# Patient Record
Sex: FEMALE | Race: BLACK OR AFRICAN AMERICAN | Employment: FULL TIME | URBAN - METROPOLITAN AREA
[De-identification: names, ages, dates, MRNs, and addresses within clinical notes are randomized per-mention and may not be internally consistent; named-entity substitution may affect disease eponyms.]

---

## 2019-01-14 ENCOUNTER — HOSPITAL ENCOUNTER (OUTPATIENT)
Dept: MAMMOGRAPHY | Age: 39
Discharge: HOME | End: 2019-01-14
Payer: COMMERCIAL

## 2019-01-14 ENCOUNTER — HOSPITAL ENCOUNTER (OUTPATIENT)
Dept: ULTRASOUND IMAGING | Age: 39
Discharge: HOME | End: 2019-01-14
Payer: COMMERCIAL

## 2019-01-14 DIAGNOSIS — R92.8 ABNORMAL MAMMOGRAM: ICD-10-CM

## 2019-01-14 DIAGNOSIS — Z12.31 VISIT FOR SCREENING MAMMOGRAM: ICD-10-CM

## 2019-01-14 PROCEDURE — 77067 SCR MAMMO BI INCL CAD: CPT

## 2019-01-14 PROCEDURE — 76642 ULTRASOUND BREAST LIMITED: CPT

## 2019-01-14 PROCEDURE — G0279 TOMOSYNTHESIS, MAMMO: HCPCS

## 2019-01-17 ENCOUNTER — HOSPITAL ENCOUNTER (OUTPATIENT)
Dept: ULTRASOUND IMAGING | Age: 39
Discharge: HOME | End: 2019-01-17
Payer: COMMERCIAL

## 2019-01-17 ENCOUNTER — HOSPITAL ENCOUNTER (OUTPATIENT)
Dept: MAMMOGRAPHY | Age: 39
Discharge: HOME | End: 2019-01-17
Payer: COMMERCIAL

## 2019-01-17 DIAGNOSIS — R92.8 ABNORMAL ULTRASOUND OF BREAST: ICD-10-CM

## 2019-01-17 DIAGNOSIS — R92.8 ABNORMAL MAMMOGRAM: ICD-10-CM

## 2019-01-17 PROCEDURE — 77065 DX MAMMO INCL CAD UNI: CPT

## 2019-01-17 PROCEDURE — 88341 IMHCHEM/IMCYTCHM EA ADD ANTB: CPT

## 2019-01-17 PROCEDURE — 88305 TISSUE EXAM BY PATHOLOGIST: CPT

## 2019-01-17 PROCEDURE — 38505 NEEDLE BIOPSY LYMPH NODES: CPT

## 2019-01-17 PROCEDURE — 88360 TUMOR IMMUNOHISTOCHEM/MANUAL: CPT

## 2019-01-17 PROCEDURE — A4648 IMPLANTABLE TISSUE MARKER: HCPCS

## 2019-01-17 PROCEDURE — 88342 IMHCHEM/IMCYTCHM 1ST ANTB: CPT

## 2019-01-18 ENCOUNTER — OFFICE VISIT (OUTPATIENT)
Dept: SURGERY | Age: 39
End: 2019-01-18
Payer: COMMERCIAL

## 2019-01-18 ENCOUNTER — TELEPHONE (OUTPATIENT)
Dept: SURGERY | Age: 39
End: 2019-01-18

## 2019-01-18 VITALS
WEIGHT: 221 LBS | HEART RATE: 83 BPM | BODY MASS INDEX: 34.69 KG/M2 | HEIGHT: 67 IN | SYSTOLIC BLOOD PRESSURE: 125 MMHG | DIASTOLIC BLOOD PRESSURE: 83 MMHG

## 2019-01-18 DIAGNOSIS — C50.412 MALIGNANT NEOPLASM OF UPPER-OUTER QUADRANT OF LEFT BREAST IN FEMALE, ESTROGEN RECEPTOR POSITIVE (HCC): Primary | ICD-10-CM

## 2019-01-18 DIAGNOSIS — Z17.0 MALIGNANT NEOPLASM OF UPPER-OUTER QUADRANT OF LEFT BREAST IN FEMALE, ESTROGEN RECEPTOR POSITIVE (HCC): Primary | ICD-10-CM

## 2019-01-18 PROBLEM — C50.411 MALIGNANT NEOPLASM OF UPPER-OUTER QUADRANT OF RIGHT BREAST IN FEMALE, ESTROGEN RECEPTOR POSITIVE (HCC): Status: ACTIVE | Noted: 2019-01-18

## 2019-01-18 PROCEDURE — G8417 CALC BMI ABV UP PARAM F/U: HCPCS | Performed by: SURGERY

## 2019-01-18 PROCEDURE — G8427 DOCREV CUR MEDS BY ELIG CLIN: HCPCS | Performed by: SURGERY

## 2019-01-18 PROCEDURE — 99244 OFF/OP CNSLTJ NEW/EST MOD 40: CPT | Performed by: SURGERY

## 2019-01-18 PROCEDURE — G8484 FLU IMMUNIZE NO ADMIN: HCPCS | Performed by: SURGERY

## 2019-01-30 ENCOUNTER — HOSPITAL ENCOUNTER (OUTPATIENT)
Dept: MRI IMAGING | Age: 39
Discharge: HOME | End: 2019-01-30
Payer: COMMERCIAL

## 2019-01-30 DIAGNOSIS — Z17.0 MALIGNANT NEOPLASM OF UPPER-OUTER QUADRANT OF RIGHT BREAST IN FEMALE, ESTROGEN RECEPTOR POSITIVE (HCC): ICD-10-CM

## 2019-01-30 DIAGNOSIS — C50.411 MALIGNANT NEOPLASM OF UPPER-OUTER QUADRANT OF RIGHT BREAST IN FEMALE, ESTROGEN RECEPTOR POSITIVE (HCC): ICD-10-CM

## 2019-01-30 PROCEDURE — 6360000004 HC RX CONTRAST MEDICATION: Performed by: SURGERY

## 2019-01-30 PROCEDURE — A9579 GAD-BASE MR CONTRAST NOS,1ML: HCPCS | Performed by: SURGERY

## 2019-01-30 PROCEDURE — 77049 MRI BREAST C-+ W/CAD BI: CPT

## 2019-01-30 RX ADMIN — GADOTERIDOL 20 ML: 279.3 INJECTION, SOLUTION INTRAVENOUS at 15:59

## 2019-01-31 ENCOUNTER — HOSPITAL ENCOUNTER (OUTPATIENT)
Dept: NON INVASIVE DIAGNOSTICS | Age: 39
Discharge: HOME | End: 2019-01-31
Payer: COMMERCIAL

## 2019-01-31 ENCOUNTER — TELEPHONE (OUTPATIENT)
Dept: SURGERY | Age: 39
End: 2019-01-31

## 2019-01-31 LAB
LV EF: 53 %
LVEF MODALITY: NORMAL

## 2019-01-31 PROCEDURE — 93306 TTE W/DOPPLER COMPLETE: CPT

## 2019-02-04 ENCOUNTER — TELEPHONE (OUTPATIENT)
Dept: SURGERY | Age: 39
End: 2019-02-04

## 2019-02-05 ENCOUNTER — TELEPHONE (OUTPATIENT)
Dept: BREAST CENTER | Age: 39
End: 2019-02-05

## 2019-02-06 ENCOUNTER — ANESTHESIA EVENT (OUTPATIENT)
Dept: OPERATING ROOM | Age: 39
End: 2019-02-06
Payer: COMMERCIAL

## 2019-02-07 ENCOUNTER — HOSPITAL ENCOUNTER (OUTPATIENT)
Age: 39
Setting detail: OUTPATIENT SURGERY
Discharge: HOME | End: 2019-02-07
Attending: SURGERY | Admitting: SURGERY
Payer: COMMERCIAL

## 2019-02-07 ENCOUNTER — ANESTHESIA (OUTPATIENT)
Dept: OPERATING ROOM | Age: 39
End: 2019-02-07
Payer: COMMERCIAL

## 2019-02-07 ENCOUNTER — APPOINTMENT (OUTPATIENT)
Dept: GENERAL RADIOLOGY | Age: 39
End: 2019-02-07
Attending: SURGERY
Payer: COMMERCIAL

## 2019-02-07 VITALS
SYSTOLIC BLOOD PRESSURE: 120 MMHG | RESPIRATION RATE: 18 BRPM | BODY MASS INDEX: 36.97 KG/M2 | TEMPERATURE: 97.7 F | HEART RATE: 74 BPM | DIASTOLIC BLOOD PRESSURE: 84 MMHG | WEIGHT: 230.05 LBS | HEIGHT: 66 IN | OXYGEN SATURATION: 99 %

## 2019-02-07 VITALS
DIASTOLIC BLOOD PRESSURE: 56 MMHG | OXYGEN SATURATION: 100 % | RESPIRATION RATE: 15 BRPM | SYSTOLIC BLOOD PRESSURE: 95 MMHG

## 2019-02-07 DIAGNOSIS — Z17.0 MALIGNANT NEOPLASM OF UPPER-OUTER QUADRANT OF RIGHT BREAST IN FEMALE, ESTROGEN RECEPTOR POSITIVE (HCC): Primary | ICD-10-CM

## 2019-02-07 DIAGNOSIS — C50.411 MALIGNANT NEOPLASM OF UPPER-OUTER QUADRANT OF RIGHT BREAST IN FEMALE, ESTROGEN RECEPTOR POSITIVE (HCC): Primary | ICD-10-CM

## 2019-02-07 LAB — PREGNANCY, URINE: NEGATIVE

## 2019-02-07 PROCEDURE — 6360000002 HC RX W HCPCS

## 2019-02-07 PROCEDURE — 76937 US GUIDE VASCULAR ACCESS: CPT | Performed by: SURGERY

## 2019-02-07 PROCEDURE — 3700000001 HC ADD 15 MINUTES (ANESTHESIA): Performed by: SURGERY

## 2019-02-07 PROCEDURE — 6360000002 HC RX W HCPCS: Performed by: SURGERY

## 2019-02-07 PROCEDURE — 2580000003 HC RX 258: Performed by: SURGERY

## 2019-02-07 PROCEDURE — 2500000003 HC RX 250 WO HCPCS: Performed by: SURGERY

## 2019-02-07 PROCEDURE — 71045 X-RAY EXAM CHEST 1 VIEW: CPT

## 2019-02-07 PROCEDURE — 77001 FLUOROGUIDE FOR VEIN DEVICE: CPT | Performed by: SURGERY

## 2019-02-07 PROCEDURE — 2580000003 HC RX 258: Performed by: ANESTHESIOLOGY

## 2019-02-07 PROCEDURE — 36561 INSERT TUNNELED CV CATH: CPT | Performed by: SURGERY

## 2019-02-07 PROCEDURE — 3600000003 HC SURGERY LEVEL 3 BASE: Performed by: SURGERY

## 2019-02-07 PROCEDURE — 2709999900 HC NON-CHARGEABLE SUPPLY: Performed by: SURGERY

## 2019-02-07 PROCEDURE — 6370000000 HC RX 637 (ALT 250 FOR IP): Performed by: SURGERY

## 2019-02-07 PROCEDURE — 7100000010 HC PHASE II RECOVERY - FIRST 15 MIN: Performed by: SURGERY

## 2019-02-07 PROCEDURE — 2500000003 HC RX 250 WO HCPCS

## 2019-02-07 PROCEDURE — 3600000013 HC SURGERY LEVEL 3 ADDTL 15MIN: Performed by: SURGERY

## 2019-02-07 PROCEDURE — 7100000000 HC PACU RECOVERY - FIRST 15 MIN: Performed by: SURGERY

## 2019-02-07 PROCEDURE — 77001 FLUOROGUIDE FOR VEIN DEVICE: CPT

## 2019-02-07 PROCEDURE — C1788 PORT, INDWELLING, IMP: HCPCS | Performed by: SURGERY

## 2019-02-07 PROCEDURE — 2500000003 HC RX 250 WO HCPCS: Performed by: ANESTHESIOLOGY

## 2019-02-07 PROCEDURE — 7100000011 HC PHASE II RECOVERY - ADDTL 15 MIN: Performed by: SURGERY

## 2019-02-07 PROCEDURE — 3700000000 HC ANESTHESIA ATTENDED CARE: Performed by: SURGERY

## 2019-02-07 PROCEDURE — 84703 CHORIONIC GONADOTROPIN ASSAY: CPT

## 2019-02-07 PROCEDURE — 7100000001 HC PACU RECOVERY - ADDTL 15 MIN: Performed by: SURGERY

## 2019-02-07 DEVICE — PORT INFUS L66CM 0.015ML 0.7ML CATH OD2.5MM ID1.4MM INTRO: Type: IMPLANTABLE DEVICE | Site: CHEST | Status: FUNCTIONAL

## 2019-02-07 RX ORDER — MORPHINE SULFATE 2 MG/ML
1 INJECTION, SOLUTION INTRAMUSCULAR; INTRAVENOUS EVERY 5 MIN PRN
Status: DISCONTINUED | OUTPATIENT
Start: 2019-02-07 | End: 2019-02-07 | Stop reason: HOSPADM

## 2019-02-07 RX ORDER — BUPIVACAINE HYDROCHLORIDE 5 MG/ML
INJECTION, SOLUTION EPIDURAL; INTRACAUDAL
Status: COMPLETED | OUTPATIENT
Start: 2019-02-07 | End: 2019-02-07

## 2019-02-07 RX ORDER — FENTANYL CITRATE 50 UG/ML
50 INJECTION, SOLUTION INTRAMUSCULAR; INTRAVENOUS EVERY 5 MIN PRN
Status: DISCONTINUED | OUTPATIENT
Start: 2019-02-07 | End: 2019-02-07 | Stop reason: HOSPADM

## 2019-02-07 RX ORDER — SODIUM CHLORIDE 0.9 % (FLUSH) 0.9 %
10 SYRINGE (ML) INJECTION PRN
Status: DISCONTINUED | OUTPATIENT
Start: 2019-02-07 | End: 2019-02-07 | Stop reason: HOSPADM

## 2019-02-07 RX ORDER — PROPOFOL 10 MG/ML
INJECTION, EMULSION INTRAVENOUS CONTINUOUS PRN
Status: DISCONTINUED | OUTPATIENT
Start: 2019-02-07 | End: 2019-02-07 | Stop reason: SDUPTHER

## 2019-02-07 RX ORDER — SODIUM CHLORIDE 0.9 % (FLUSH) 0.9 %
10 SYRINGE (ML) INJECTION EVERY 12 HOURS SCHEDULED
Status: DISCONTINUED | OUTPATIENT
Start: 2019-02-07 | End: 2019-02-07 | Stop reason: HOSPADM

## 2019-02-07 RX ORDER — ONDANSETRON 2 MG/ML
4 INJECTION INTRAMUSCULAR; INTRAVENOUS
Status: DISCONTINUED | OUTPATIENT
Start: 2019-02-07 | End: 2019-02-07 | Stop reason: HOSPADM

## 2019-02-07 RX ORDER — FENTANYL CITRATE 50 UG/ML
INJECTION, SOLUTION INTRAMUSCULAR; INTRAVENOUS PRN
Status: DISCONTINUED | OUTPATIENT
Start: 2019-02-07 | End: 2019-02-07 | Stop reason: SDUPTHER

## 2019-02-07 RX ORDER — MORPHINE SULFATE 2 MG/ML
2 INJECTION, SOLUTION INTRAMUSCULAR; INTRAVENOUS EVERY 5 MIN PRN
Status: DISCONTINUED | OUTPATIENT
Start: 2019-02-07 | End: 2019-02-07 | Stop reason: HOSPADM

## 2019-02-07 RX ORDER — LIDOCAINE HYDROCHLORIDE 20 MG/ML
INJECTION, SOLUTION EPIDURAL; INFILTRATION; INTRACAUDAL; PERINEURAL PRN
Status: DISCONTINUED | OUTPATIENT
Start: 2019-02-07 | End: 2019-02-07 | Stop reason: SDUPTHER

## 2019-02-07 RX ORDER — OXYCODONE HYDROCHLORIDE AND ACETAMINOPHEN 5; 325 MG/1; MG/1
1 TABLET ORAL PRN
Status: DISCONTINUED | OUTPATIENT
Start: 2019-02-07 | End: 2019-02-07 | Stop reason: HOSPADM

## 2019-02-07 RX ORDER — PROPOFOL 10 MG/ML
INJECTION, EMULSION INTRAVENOUS PRN
Status: DISCONTINUED | OUTPATIENT
Start: 2019-02-07 | End: 2019-02-07 | Stop reason: SDUPTHER

## 2019-02-07 RX ORDER — MIDAZOLAM HYDROCHLORIDE 1 MG/ML
INJECTION INTRAMUSCULAR; INTRAVENOUS PRN
Status: DISCONTINUED | OUTPATIENT
Start: 2019-02-07 | End: 2019-02-07 | Stop reason: SDUPTHER

## 2019-02-07 RX ORDER — MEPERIDINE HYDROCHLORIDE 25 MG/ML
12.5 INJECTION INTRAMUSCULAR; INTRAVENOUS; SUBCUTANEOUS EVERY 5 MIN PRN
Status: DISCONTINUED | OUTPATIENT
Start: 2019-02-07 | End: 2019-02-07 | Stop reason: HOSPADM

## 2019-02-07 RX ORDER — OXYCODONE HYDROCHLORIDE AND ACETAMINOPHEN 5; 325 MG/1; MG/1
2 TABLET ORAL PRN
Status: DISCONTINUED | OUTPATIENT
Start: 2019-02-07 | End: 2019-02-07 | Stop reason: HOSPADM

## 2019-02-07 RX ORDER — HYDROCODONE BITARTRATE AND ACETAMINOPHEN 5; 325 MG/1; MG/1
1 TABLET ORAL EVERY 6 HOURS PRN
Qty: 6 TABLET | Refills: 0 | Status: SHIPPED | OUTPATIENT
Start: 2019-02-07 | End: 2019-02-10

## 2019-02-07 RX ORDER — ACETAMINOPHEN 325 MG/1
650 TABLET ORAL
Status: COMPLETED | OUTPATIENT
Start: 2019-02-07 | End: 2019-02-07

## 2019-02-07 RX ORDER — SODIUM CHLORIDE 9 MG/ML
INJECTION, SOLUTION INTRAVENOUS CONTINUOUS
Status: DISCONTINUED | OUTPATIENT
Start: 2019-02-07 | End: 2019-02-07 | Stop reason: HOSPADM

## 2019-02-07 RX ORDER — FENTANYL CITRATE 50 UG/ML
25 INJECTION, SOLUTION INTRAMUSCULAR; INTRAVENOUS EVERY 5 MIN PRN
Status: DISCONTINUED | OUTPATIENT
Start: 2019-02-07 | End: 2019-02-07 | Stop reason: HOSPADM

## 2019-02-07 RX ADMIN — PROPOFOL 40 MG: 10 INJECTION, EMULSION INTRAVENOUS at 07:43

## 2019-02-07 RX ADMIN — LIDOCAINE HYDROCHLORIDE 100 MG: 20 INJECTION, SOLUTION EPIDURAL; INFILTRATION; INTRACAUDAL; PERINEURAL at 07:43

## 2019-02-07 RX ADMIN — FAMOTIDINE 20 MG: 10 INJECTION, SOLUTION INTRAVENOUS at 06:44

## 2019-02-07 RX ADMIN — ACETAMINOPHEN 650 MG: 325 TABLET, FILM COATED ORAL at 06:44

## 2019-02-07 RX ADMIN — SODIUM CHLORIDE: 9 INJECTION, SOLUTION INTRAVENOUS at 06:44

## 2019-02-07 RX ADMIN — FENTANYL CITRATE 50 MCG: 50 INJECTION INTRAMUSCULAR; INTRAVENOUS at 07:42

## 2019-02-07 RX ADMIN — MIDAZOLAM 2 MG: 1 INJECTION INTRAMUSCULAR; INTRAVENOUS at 07:39

## 2019-02-07 RX ADMIN — FENTANYL CITRATE 25 MCG: 50 INJECTION INTRAMUSCULAR; INTRAVENOUS at 07:55

## 2019-02-07 RX ADMIN — FENTANYL CITRATE 25 MCG: 50 INJECTION INTRAMUSCULAR; INTRAVENOUS at 07:59

## 2019-02-07 RX ADMIN — SODIUM CHLORIDE: 9 INJECTION, SOLUTION INTRAVENOUS at 07:08

## 2019-02-07 RX ADMIN — PROPOFOL 150 MCG/KG/MIN: 10 INJECTION, EMULSION INTRAVENOUS at 07:43

## 2019-02-07 RX ADMIN — Medication 2 G: at 07:39

## 2019-02-07 ASSESSMENT — PULMONARY FUNCTION TESTS
PIF_VALUE: 1
PIF_VALUE: 0
PIF_VALUE: 0
PIF_VALUE: 1
PIF_VALUE: 0
PIF_VALUE: 0
PIF_VALUE: 1
PIF_VALUE: 0
PIF_VALUE: 1
PIF_VALUE: 0
PIF_VALUE: 0
PIF_VALUE: 1
PIF_VALUE: 0
PIF_VALUE: 1
PIF_VALUE: 0
PIF_VALUE: 0
PIF_VALUE: 1
PIF_VALUE: 0
PIF_VALUE: 1
PIF_VALUE: 0
PIF_VALUE: 0
PIF_VALUE: 1
PIF_VALUE: 0
PIF_VALUE: 0
PIF_VALUE: 1
PIF_VALUE: 1
PIF_VALUE: 0
PIF_VALUE: 1
PIF_VALUE: 0
PIF_VALUE: 1
PIF_VALUE: 0
PIF_VALUE: 0
PIF_VALUE: 1
PIF_VALUE: 0
PIF_VALUE: 1
PIF_VALUE: 0
PIF_VALUE: 1
PIF_VALUE: 0
PIF_VALUE: 0
PIF_VALUE: 1

## 2019-02-07 ASSESSMENT — PAIN SCALES - GENERAL
PAINLEVEL_OUTOF10: 0
PAINLEVEL_OUTOF10: 3

## 2019-02-07 ASSESSMENT — PAIN DESCRIPTION - PROGRESSION: CLINICAL_PROGRESSION: GRADUALLY WORSENING

## 2019-02-07 ASSESSMENT — PAIN DESCRIPTION - FREQUENCY: FREQUENCY: CONTINUOUS

## 2019-02-07 ASSESSMENT — LIFESTYLE VARIABLES: SMOKING_STATUS: 0

## 2019-02-07 ASSESSMENT — PAIN - FUNCTIONAL ASSESSMENT: PAIN_FUNCTIONAL_ASSESSMENT: 0-10

## 2019-02-07 ASSESSMENT — PAIN DESCRIPTION - LOCATION: LOCATION: CHEST

## 2019-02-07 ASSESSMENT — PAIN DESCRIPTION - PAIN TYPE: TYPE: SURGICAL PAIN

## 2019-02-07 ASSESSMENT — PAIN DESCRIPTION - ORIENTATION: ORIENTATION: RIGHT

## 2019-03-29 ENCOUNTER — HOSPITAL ENCOUNTER (OUTPATIENT)
Dept: ONCOLOGY | Age: 39
Setting detail: INFUSION SERIES
Discharge: HOME | End: 2019-03-29
Payer: COMMERCIAL

## 2019-03-29 ENCOUNTER — HOSPITAL ENCOUNTER (INPATIENT)
Age: 39
LOS: 4 days | Discharge: HOME | DRG: 710 | End: 2019-04-03
Attending: EMERGENCY MEDICINE | Admitting: STUDENT IN AN ORGANIZED HEALTH CARE EDUCATION/TRAINING PROGRAM
Payer: COMMERCIAL

## 2019-03-29 ENCOUNTER — APPOINTMENT (OUTPATIENT)
Dept: GENERAL RADIOLOGY | Age: 39
DRG: 710 | End: 2019-03-29
Payer: COMMERCIAL

## 2019-03-29 VITALS
RESPIRATION RATE: 18 BRPM | HEART RATE: 123 BPM | DIASTOLIC BLOOD PRESSURE: 71 MMHG | TEMPERATURE: 99.9 F | SYSTOLIC BLOOD PRESSURE: 104 MMHG

## 2019-03-29 DIAGNOSIS — R78.81 POSITIVE BLOOD CULTURE: Primary | ICD-10-CM

## 2019-03-29 DIAGNOSIS — B95.61 STAPHYLOCOCCUS AUREUS BACTEREMIA: ICD-10-CM

## 2019-03-29 DIAGNOSIS — R78.81 STAPHYLOCOCCUS AUREUS BACTEREMIA: ICD-10-CM

## 2019-03-29 LAB
ABO/RH: NORMAL
ANION GAP SERPL CALCULATED.3IONS-SCNC: 16 MMOL/L (ref 3–16)
ANTIBODY SCREEN: NORMAL
BASOPHILS ABSOLUTE: 0 K/UL (ref 0–0.2)
BASOPHILS RELATIVE PERCENT: 0.2 %
BUN BLDV-MCNC: 11 MG/DL (ref 7–20)
CALCIUM SERPL-MCNC: 9 MG/DL (ref 8.3–10.6)
CHLORIDE BLD-SCNC: 92 MMOL/L (ref 99–110)
CO2: 25 MMOL/L (ref 21–32)
CREAT SERPL-MCNC: 0.6 MG/DL (ref 0.6–1.1)
EOSINOPHILS ABSOLUTE: 0 K/UL (ref 0–0.6)
EOSINOPHILS RELATIVE PERCENT: 0 %
GFR AFRICAN AMERICAN: >60
GFR NON-AFRICAN AMERICAN: >60
GLUCOSE BLD-MCNC: 190 MG/DL (ref 70–99)
HCT VFR BLD CALC: 24.2 % (ref 36–48)
HEMOGLOBIN: 7.8 G/DL (ref 12–16)
LYMPHOCYTES ABSOLUTE: 0.5 K/UL (ref 1–5.1)
LYMPHOCYTES RELATIVE PERCENT: 22.2 %
MCH RBC QN AUTO: 21.8 PG (ref 26–34)
MCHC RBC AUTO-ENTMCNC: 32.3 G/DL (ref 31–36)
MCV RBC AUTO: 67.5 FL (ref 80–100)
MONOCYTES ABSOLUTE: 0.5 K/UL (ref 0–1.3)
MONOCYTES RELATIVE PERCENT: 21.5 %
NEUTROPHILS ABSOLUTE: 1.4 K/UL (ref 1.7–7.7)
NEUTROPHILS RELATIVE PERCENT: 56.1 %
PDW BLD-RTO: 21.6 % (ref 12.4–15.4)
PLATELET # BLD: 102 K/UL (ref 135–450)
PMV BLD AUTO: 9.2 FL (ref 5–10.5)
POTASSIUM SERPL-SCNC: 3.4 MMOL/L (ref 3.5–5.1)
RAPID INFLUENZA  B AGN: NEGATIVE
RAPID INFLUENZA A AGN: NEGATIVE
RBC # BLD: 3.58 M/UL (ref 4–5.2)
SODIUM BLD-SCNC: 133 MMOL/L (ref 136–145)
WBC # BLD: 2.5 K/UL (ref 4–11)

## 2019-03-29 PROCEDURE — 86901 BLOOD TYPING SEROLOGIC RH(D): CPT

## 2019-03-29 PROCEDURE — 85025 COMPLETE CBC W/AUTO DIFF WBC: CPT

## 2019-03-29 PROCEDURE — 87040 BLOOD CULTURE FOR BACTERIA: CPT

## 2019-03-29 PROCEDURE — 71046 X-RAY EXAM CHEST 2 VIEWS: CPT

## 2019-03-29 PROCEDURE — 87804 INFLUENZA ASSAY W/OPTIC: CPT

## 2019-03-29 PROCEDURE — 86850 RBC ANTIBODY SCREEN: CPT

## 2019-03-29 PROCEDURE — 36591 DRAW BLOOD OFF VENOUS DEVICE: CPT

## 2019-03-29 PROCEDURE — 36415 COLL VENOUS BLD VENIPUNCTURE: CPT

## 2019-03-29 PROCEDURE — 2580000003 HC RX 258: Performed by: EMERGENCY MEDICINE

## 2019-03-29 PROCEDURE — 86900 BLOOD TYPING SEROLOGIC ABO: CPT

## 2019-03-29 PROCEDURE — 96365 THER/PROPH/DIAG IV INF INIT: CPT

## 2019-03-29 PROCEDURE — 80048 BASIC METABOLIC PNL TOTAL CA: CPT

## 2019-03-29 PROCEDURE — 99284 EMERGENCY DEPT VISIT MOD MDM: CPT

## 2019-03-29 PROCEDURE — 36430 TRANSFUSION BLD/BLD COMPNT: CPT

## 2019-03-29 PROCEDURE — 99201 HC NEW PT, OUTPT VISIT LEVEL 1: CPT

## 2019-03-29 PROCEDURE — 6360000002 HC RX W HCPCS: Performed by: INTERNAL MEDICINE

## 2019-03-29 PROCEDURE — 86923 COMPATIBILITY TEST ELECTRIC: CPT

## 2019-03-29 PROCEDURE — 83735 ASSAY OF MAGNESIUM: CPT

## 2019-03-29 PROCEDURE — 96375 TX/PRO/DX INJ NEW DRUG ADDON: CPT

## 2019-03-29 PROCEDURE — P9016 RBC LEUKOCYTES REDUCED: HCPCS

## 2019-03-29 PROCEDURE — 6360000002 HC RX W HCPCS: Performed by: EMERGENCY MEDICINE

## 2019-03-29 RX ORDER — HEPARIN SODIUM (PORCINE) LOCK FLUSH IV SOLN 100 UNIT/ML 100 UNIT/ML
300 SOLUTION INTRAVENOUS PRN
Status: DISCONTINUED | OUTPATIENT
Start: 2019-03-29 | End: 2019-03-30 | Stop reason: HOSPADM

## 2019-03-29 RX ORDER — LOPERAMIDE HYDROCHLORIDE 2 MG/1
2 CAPSULE ORAL 4 TIMES DAILY PRN
COMMUNITY
End: 2019-08-01

## 2019-03-29 RX ORDER — 0.9 % SODIUM CHLORIDE 0.9 %
250 INTRAVENOUS SOLUTION INTRAVENOUS ONCE
Status: DISCONTINUED | OUTPATIENT
Start: 2019-03-29 | End: 2019-03-30 | Stop reason: HOSPADM

## 2019-03-29 RX ORDER — 0.9 % SODIUM CHLORIDE 0.9 %
30 INTRAVENOUS SOLUTION INTRAVENOUS ONCE
Status: COMPLETED | OUTPATIENT
Start: 2019-03-29 | End: 2019-03-30

## 2019-03-29 RX ORDER — HEPARIN SODIUM (PORCINE) LOCK FLUSH IV SOLN 100 UNIT/ML 100 UNIT/ML
300 SOLUTION INTRAVENOUS PRN
Status: CANCELLED | OUTPATIENT
Start: 2019-03-29

## 2019-03-29 RX ORDER — LORATADINE 10 MG/1
10 TABLET ORAL DAILY
COMMUNITY
End: 2019-05-10 | Stop reason: ALTCHOICE

## 2019-03-29 RX ADMIN — CEFEPIME HYDROCHLORIDE 1 G: 1 INJECTION, POWDER, FOR SOLUTION INTRAMUSCULAR; INTRAVENOUS at 23:15

## 2019-03-29 RX ADMIN — Medication 300 UNITS: at 16:45

## 2019-03-29 RX ADMIN — SODIUM CHLORIDE 2886 ML: 9 INJECTION, SOLUTION INTRAVENOUS at 23:15

## 2019-03-29 RX ADMIN — VANCOMYCIN HYDROCHLORIDE 1750 MG: 10 INJECTION, POWDER, LYOPHILIZED, FOR SOLUTION INTRAVENOUS at 23:51

## 2019-03-29 ASSESSMENT — ENCOUNTER SYMPTOMS
NAUSEA: 0
ABDOMINAL PAIN: 0
CHEST TIGHTNESS: 0
DIARRHEA: 0
WHEEZING: 0
COUGH: 1
VOMITING: 0
SHORTNESS OF BREATH: 0

## 2019-03-30 ENCOUNTER — APPOINTMENT (OUTPATIENT)
Dept: GENERAL RADIOLOGY | Age: 39
DRG: 710 | End: 2019-03-30
Payer: COMMERCIAL

## 2019-03-30 ENCOUNTER — HOSPITAL ENCOUNTER (OUTPATIENT)
Dept: ONCOLOGY | Age: 39
Setting detail: INFUSION SERIES
Discharge: HOME | End: 2019-03-30
Payer: COMMERCIAL

## 2019-03-30 PROBLEM — R78.81 STAPHYLOCOCCUS AUREUS BACTEREMIA: Status: ACTIVE | Noted: 2019-03-30

## 2019-03-30 PROBLEM — B95.61 STAPHYLOCOCCUS AUREUS BACTEREMIA: Status: ACTIVE | Noted: 2019-03-30

## 2019-03-30 LAB
ALBUMIN SERPL-MCNC: 2.7 G/DL (ref 3.4–5)
ALP BLD-CCNC: 55 U/L (ref 40–129)
ALT SERPL-CCNC: 75 U/L (ref 10–40)
ANION GAP SERPL CALCULATED.3IONS-SCNC: 12 MMOL/L (ref 3–16)
AST SERPL-CCNC: 84 U/L (ref 15–37)
BACTERIA: ABNORMAL /HPF
BANDED NEUTROPHILS RELATIVE PERCENT: 5 % (ref 0–7)
BASOPHILS ABSOLUTE: 0 K/UL (ref 0–0.2)
BASOPHILS RELATIVE PERCENT: 0 %
BILIRUB SERPL-MCNC: <0.2 MG/DL (ref 0–1)
BILIRUBIN DIRECT: <0.2 MG/DL (ref 0–0.3)
BILIRUBIN URINE: NEGATIVE
BILIRUBIN, INDIRECT: ABNORMAL MG/DL (ref 0–1)
BLOOD, URINE: ABNORMAL
BUN BLDV-MCNC: 9 MG/DL (ref 7–20)
CALCIUM SERPL-MCNC: 8.1 MG/DL (ref 8.3–10.6)
CHLORIDE BLD-SCNC: 101 MMOL/L (ref 99–110)
CLARITY: CLEAR
CO2: 23 MMOL/L (ref 21–32)
COLOR: YELLOW
CREAT SERPL-MCNC: <0.5 MG/DL (ref 0.6–1.1)
EOSINOPHILS ABSOLUTE: 0 K/UL (ref 0–0.6)
EOSINOPHILS RELATIVE PERCENT: 0 %
EPITHELIAL CELLS, UA: ABNORMAL /HPF
GFR AFRICAN AMERICAN: >60
GFR NON-AFRICAN AMERICAN: >60
GLUCOSE BLD-MCNC: 201 MG/DL (ref 70–99)
GLUCOSE URINE: NEGATIVE MG/DL
HCG(URINE) PREGNANCY TEST: NEGATIVE
HCT VFR BLD CALC: 20.9 % (ref 36–48)
HCT VFR BLD CALC: 25.4 % (ref 36–48)
HEMOGLOBIN: 6.8 G/DL (ref 12–16)
HEMOGLOBIN: 8.3 G/DL (ref 12–16)
KETONES, URINE: NEGATIVE MG/DL
LACTIC ACID, SEPSIS: 1.2 MMOL/L (ref 0.4–1.9)
LEUKOCYTE ESTERASE, URINE: ABNORMAL
LYMPHOCYTES ABSOLUTE: 0.6 K/UL (ref 1–5.1)
LYMPHOCYTES RELATIVE PERCENT: 25 %
MAGNESIUM: 1.7 MG/DL (ref 1.8–2.4)
MAGNESIUM: 1.9 MG/DL (ref 1.8–2.4)
MCH RBC QN AUTO: 21.8 PG (ref 26–34)
MCHC RBC AUTO-ENTMCNC: 32.4 G/DL (ref 31–36)
MCV RBC AUTO: 67.4 FL (ref 80–100)
MICROSCOPIC EXAMINATION: YES
MONOCYTES ABSOLUTE: 0.5 K/UL (ref 0–1.3)
MONOCYTES RELATIVE PERCENT: 22 %
NEUTROPHILS ABSOLUTE: 1.2 K/UL (ref 1.7–7.7)
NEUTROPHILS RELATIVE PERCENT: 48 %
NITRITE, URINE: NEGATIVE
PDW BLD-RTO: 21.9 % (ref 12.4–15.4)
PH UA: 6.5 (ref 5–8)
PLATELET # BLD: 108 K/UL (ref 135–450)
PMV BLD AUTO: 9.1 FL (ref 5–10.5)
POTASSIUM REFLEX MAGNESIUM: 3.4 MMOL/L (ref 3.5–5.1)
PROTEIN UA: 30 MG/DL
RBC # BLD: 3.1 M/UL (ref 4–5.2)
RBC UA: ABNORMAL /HPF (ref 0–2)
REPORT: NORMAL
RESPIRATORY PANEL PCR: NORMAL
SODIUM BLD-SCNC: 136 MMOL/L (ref 136–145)
SPECIFIC GRAVITY UA: 1.01 (ref 1–1.03)
TOTAL PROTEIN: 6.7 G/DL (ref 6.4–8.2)
URINE TYPE: ABNORMAL
UROBILINOGEN, URINE: 0.2 E.U./DL
WBC # BLD: 2.2 K/UL (ref 4–11)
WBC UA: ABNORMAL /HPF (ref 0–5)

## 2019-03-30 PROCEDURE — 80076 HEPATIC FUNCTION PANEL: CPT

## 2019-03-30 PROCEDURE — 94761 N-INVAS EAR/PLS OXIMETRY MLT: CPT

## 2019-03-30 PROCEDURE — 86850 RBC ANTIBODY SCREEN: CPT

## 2019-03-30 PROCEDURE — 80048 BASIC METABOLIC PNL TOTAL CA: CPT

## 2019-03-30 PROCEDURE — 85018 HEMOGLOBIN: CPT

## 2019-03-30 PROCEDURE — 87086 URINE CULTURE/COLONY COUNT: CPT

## 2019-03-30 PROCEDURE — 85014 HEMATOCRIT: CPT

## 2019-03-30 PROCEDURE — 81001 URINALYSIS AUTO W/SCOPE: CPT

## 2019-03-30 PROCEDURE — 2580000003 HC RX 258: Performed by: STUDENT IN AN ORGANIZED HEALTH CARE EDUCATION/TRAINING PROGRAM

## 2019-03-30 PROCEDURE — 83605 ASSAY OF LACTIC ACID: CPT

## 2019-03-30 PROCEDURE — 84703 CHORIONIC GONADOTROPIN ASSAY: CPT

## 2019-03-30 PROCEDURE — 87633 RESP VIRUS 12-25 TARGETS: CPT

## 2019-03-30 PROCEDURE — 87449 NOS EACH ORGANISM AG IA: CPT

## 2019-03-30 PROCEDURE — 71045 X-RAY EXAM CHEST 1 VIEW: CPT

## 2019-03-30 PROCEDURE — 1200000000 HC SEMI PRIVATE

## 2019-03-30 PROCEDURE — 86923 COMPATIBILITY TEST ELECTRIC: CPT

## 2019-03-30 PROCEDURE — 6370000000 HC RX 637 (ALT 250 FOR IP): Performed by: STUDENT IN AN ORGANIZED HEALTH CARE EDUCATION/TRAINING PROGRAM

## 2019-03-30 PROCEDURE — 85025 COMPLETE CBC W/AUTO DIFF WBC: CPT

## 2019-03-30 PROCEDURE — 87798 DETECT AGENT NOS DNA AMP: CPT

## 2019-03-30 PROCEDURE — 87186 SC STD MICRODIL/AGAR DIL: CPT

## 2019-03-30 PROCEDURE — 87077 CULTURE AEROBIC IDENTIFY: CPT

## 2019-03-30 PROCEDURE — 89220 SPUTUM SPECIMEN COLLECTION: CPT

## 2019-03-30 PROCEDURE — P9016 RBC LEUKOCYTES REDUCED: HCPCS

## 2019-03-30 PROCEDURE — 87486 CHLMYD PNEUM DNA AMP PROBE: CPT

## 2019-03-30 PROCEDURE — 87581 M.PNEUMON DNA AMP PROBE: CPT

## 2019-03-30 PROCEDURE — 6360000002 HC RX W HCPCS: Performed by: STUDENT IN AN ORGANIZED HEALTH CARE EDUCATION/TRAINING PROGRAM

## 2019-03-30 PROCEDURE — 86901 BLOOD TYPING SEROLOGIC RH(D): CPT

## 2019-03-30 PROCEDURE — 6370000000 HC RX 637 (ALT 250 FOR IP): Performed by: INTERNAL MEDICINE

## 2019-03-30 PROCEDURE — 86900 BLOOD TYPING SEROLOGIC ABO: CPT

## 2019-03-30 PROCEDURE — 83735 ASSAY OF MAGNESIUM: CPT

## 2019-03-30 RX ORDER — SODIUM CHLORIDE 0.9 % (FLUSH) 0.9 %
10 SYRINGE (ML) INJECTION EVERY 12 HOURS SCHEDULED
Status: DISCONTINUED | OUTPATIENT
Start: 2019-03-30 | End: 2019-04-03 | Stop reason: HOSPADM

## 2019-03-30 RX ORDER — SODIUM CHLORIDE 0.9 % (FLUSH) 0.9 %
10 SYRINGE (ML) INJECTION PRN
Status: DISCONTINUED | OUTPATIENT
Start: 2019-03-30 | End: 2019-04-03 | Stop reason: HOSPADM

## 2019-03-30 RX ORDER — POTASSIUM CHLORIDE 20 MEQ/1
20 TABLET, EXTENDED RELEASE ORAL ONCE
Status: COMPLETED | OUTPATIENT
Start: 2019-03-30 | End: 2019-03-30

## 2019-03-30 RX ORDER — IBUPROFEN 400 MG/1
400 TABLET ORAL EVERY 8 HOURS PRN
Status: DISCONTINUED | OUTPATIENT
Start: 2019-03-30 | End: 2019-04-03 | Stop reason: HOSPADM

## 2019-03-30 RX ORDER — ACETAMINOPHEN 325 MG/1
650 TABLET ORAL EVERY 4 HOURS PRN
Status: DISCONTINUED | OUTPATIENT
Start: 2019-03-30 | End: 2019-04-03 | Stop reason: HOSPADM

## 2019-03-30 RX ORDER — MAGNESIUM SULFATE 1 G/100ML
1 INJECTION INTRAVENOUS ONCE
Status: COMPLETED | OUTPATIENT
Start: 2019-03-30 | End: 2019-03-30

## 2019-03-30 RX ORDER — SODIUM CHLORIDE 9 MG/ML
INJECTION, SOLUTION INTRAVENOUS CONTINUOUS
Status: DISCONTINUED | OUTPATIENT
Start: 2019-03-30 | End: 2019-03-30

## 2019-03-30 RX ORDER — CALCIUM CARBONATE 200(500)MG
500 TABLET,CHEWABLE ORAL 3 TIMES DAILY PRN
Status: DISCONTINUED | OUTPATIENT
Start: 2019-03-30 | End: 2019-04-03 | Stop reason: HOSPADM

## 2019-03-30 RX ORDER — POTASSIUM CHLORIDE 1.5 G/1.77G
40 POWDER, FOR SOLUTION ORAL ONCE
Status: COMPLETED | OUTPATIENT
Start: 2019-03-30 | End: 2019-03-30

## 2019-03-30 RX ADMIN — MAGNESIUM SULFATE HEPTAHYDRATE 1 G: 1 INJECTION, SOLUTION INTRAVENOUS at 04:34

## 2019-03-30 RX ADMIN — VANCOMYCIN HYDROCHLORIDE 1500 MG: 10 INJECTION, POWDER, LYOPHILIZED, FOR SOLUTION INTRAVENOUS at 23:38

## 2019-03-30 RX ADMIN — ENOXAPARIN SODIUM 40 MG: 40 INJECTION SUBCUTANEOUS at 11:13

## 2019-03-30 RX ADMIN — SODIUM CHLORIDE: 9 INJECTION, SOLUTION INTRAVENOUS at 04:43

## 2019-03-30 RX ADMIN — POTASSIUM CHLORIDE 40 MEQ: 1.5 POWDER, FOR SOLUTION ORAL at 06:46

## 2019-03-30 RX ADMIN — VANCOMYCIN HYDROCHLORIDE 1500 MG: 10 INJECTION, POWDER, LYOPHILIZED, FOR SOLUTION INTRAVENOUS at 08:53

## 2019-03-30 RX ADMIN — CALCIUM CARBONATE 500 MG: 500 TABLET, CHEWABLE ORAL at 06:46

## 2019-03-30 RX ADMIN — CEFEPIME 2 G: 2 INJECTION, POWDER, FOR SOLUTION INTRAVENOUS at 20:42

## 2019-03-30 RX ADMIN — Medication 10 ML: at 20:42

## 2019-03-30 RX ADMIN — POTASSIUM CHLORIDE 20 MEQ: 20 TABLET, EXTENDED RELEASE ORAL at 04:34

## 2019-03-30 RX ADMIN — VANCOMYCIN HYDROCHLORIDE 1500 MG: 10 INJECTION, POWDER, LYOPHILIZED, FOR SOLUTION INTRAVENOUS at 16:16

## 2019-03-30 RX ADMIN — CEFEPIME 2 G: 2 INJECTION, POWDER, FOR SOLUTION INTRAVENOUS at 11:13

## 2019-03-30 ASSESSMENT — ENCOUNTER SYMPTOMS
VOMITING: 1
RHINORRHEA: 0
SORE THROAT: 0
SHORTNESS OF BREATH: 1
COUGH: 1
CONSTIPATION: 0
COLOR CHANGE: 0
ABDOMINAL PAIN: 0
PHOTOPHOBIA: 0
NAUSEA: 1
DIARRHEA: 1
CHEST TIGHTNESS: 0

## 2019-03-30 ASSESSMENT — PAIN SCALES - GENERAL
PAINLEVEL_OUTOF10: 0

## 2019-03-30 NOTE — PLAN OF CARE
Problem: Safety:  Goal: Free from accidental physical injury  Description  Free from accidental physical injury  Outcome: Ongoing  Note:   Patient remains free of fall/accidental injury this shift. Currently resting quietly in bed, call lihght within reach. Non-slip socks in place, bed in low position, wheels locked. Problem: Skin Integrity:  Goal: Skin integrity will stabilize  Description  Skin integrity will stabilize  Outcome: Ongoing  Note:   Patient turns/repositions independently. Barrier wipes offered and applied with oleg-care.  Will continue to monitor

## 2019-03-30 NOTE — H&P
Internal Medicine   History and Physical  PGY-1      Patient's PCP: Norberto López MD, MD    Date of Admission: 3/29/2019    Date of Service: Pt seen/examined on 3/30/2019 and Admitted to Inpatient with expected LOS greaterthan two midnights due to medical therapy. CC: Positive Blood Cultures     HISTORY OF PRESENT ILLNESS:  43 y.o. female with a PMH invasive ductal carcinoma of left breast, sickle cell trait presenting due to positive blood cultures. Patient was called by Dr. Cornell Deal and told to come in because the antibiotics (patient is unsure what she was prescribed) she was on would not cover the bacteria she was growing. Patient was at [facility] to receive iron and IVF earlier today, along with pRBC. Patient has been fatigued over the last 4 days. States she feels less fatigued after receiving IV iron and pRBC. Patient was noted to have a fever 103 while at [facility]. Has not taken any tylenol or ibuprofen. Patient endorses non-productive cough and shortness of breath. Denies any sick contacts. Denies chest pain, abdominal pain, constipation, dysuria, headache, rhinorrhea, sore throat, congestion, vision changes, chills. Does have some diarrhea when she has chemotherapy but takes imodium which resolves it. Does endorse 2 episodes of emesis earlier this week. Also with decreased appetite. Patient has a port on right side and it was placed February 7. Denies any pain, swelling or erythema around port site. No rashes, lesions or open wounds. Last chemotherapy session was on Friday (has completed 3 treatments). In ED, afebrile, RR 16-30, , /75 (lowest 92/65), satting 97% on RA. WBC 2.5, Hgb 7.8, plts 102. CXR with possible diffuse airspace opacities which may represent inflammatory infectious process versus pulmonary vascular congestion. Rapid flu negative. Patient started on 30 cc/kg NS, & started on Vanc/Cefepime.      Past Medical / Surgical History:    Past Medical History:   Diagnosis Date  Breast cancer ([facility] [address] 2019    Left breast    Sickle cell trait ([facility] [address]      Past Surgical History:   Procedure Laterality Date     SECTION      X 1    INSERTION / REMOVAL / REPLACEMENT VENOUS ACCESS CATHETER N/A 2019    PORT-A-CATHETER PLACEMENT FOR CHEMOTHERAPY ACCISS/CHEMO TO START 19 WITH C-ARM performed by Yuliet Lopez MD at [address] [place] [address] Right 2019       Medications Prior to Admission:    Prior to Admission medications    Medication Sig Start Date End Date Taking? Authorizing Provider   loratadine (CLARITIN) 10 MG tablet Take 10 mg by mouth daily   Yes Historical Provider, MD   loperamide (IMODIUM) 2 MG capsule Take 2 mg by mouth 4 times daily as needed for Diarrhea   Yes Historical Provider, MD       Allergies:  Patient has no known allergies. Social History:   TOBACCO:   reports that she has never smoked. She has never used smokeless tobacco.     ETOH:   reports that she does not drink alcohol. Family History:       Problem Relation Age of Onset    High Blood Pressure Mother     Diabetes Mother     High Blood Pressure Father     Anemia Sister         Sickle Cell    Cancer Maternal Aunt         Throat    Colon Cancer Maternal Grandmother        ROS:   Review of Systems   Constitutional: Positive for appetite change, fatigue and fever. Negative for chills. HENT: Positive for ear pain (left ear fullness). Negative for congestion, rhinorrhea and sore throat. Eyes: Negative for photophobia and visual disturbance. Respiratory: Positive for cough and shortness of breath. Negative for chest tightness. Cardiovascular: Negative for chest pain, palpitations and leg swelling. Gastrointestinal: Positive for diarrhea (after chemotherapy), nausea and vomiting. Negative for abdominal pain and constipation. Genitourinary: Negative for dysuria, frequency and urgency.    Musculoskeletal: Negative for neck pain and neck stiffness. Skin: Negative for color change, pallor, rash and wound. Neurological: Positive for weakness (generalized). Negative for dizziness, syncope, speech difficulty, light-headedness, numbness and headaches. Allother systems reviewed and are negative. PHYSICAL EXAM:  BP 92/65   Pulse 103   Temp 98.9 °F (37.2 °C) (Oral)   Resp 25   Ht 5' 6\" (1.676 m)   Wt 212 lb (96.2 kg)   LMP 03/15/2019 (Within Days)   SpO2 97%   BMI 34.22 kg/m²   No results for input(s): POCGLU in the last 72 hours. Physical Exam   Constitutional: She is oriented to person, place, and time. She appears well-developed and well-nourished. No distress. Appears tired    HENT:   Head: Normocephalic and atraumatic. Right Ear: External ear normal.   Left Ear: External ear normal.   Mouth/Throat: Oropharynx is clear and moist. No oropharyngeal exudate. TM clear and intact, no bulging or erythema   Eyes: Pupils are equal, round, and reactive to light. EOM are normal. Right eye exhibits no discharge. Left eye exhibits no discharge. No scleral icterus. No conjunctival pallor   Neck: Normal range of motion. Neck supple. Cardiovascular: Normal rate, regular rhythm and normal heart sounds. Exam reveals no gallop and no friction rub. No murmur heard. Pulmonary/Chest: Effort normal and breath sounds normal. No stridor. No respiratory distress. She has no wheezes. She has no rales. Abdominal: Soft. Bowel sounds are normal. She exhibits no distension. There is no tenderness. There is no rebound and no guarding. Musculoskeletal: She exhibits no edema or tenderness. Neurological: She is alert and oriented to person, place, and time. No cranial nerve deficit. Skin: Skin is warm and dry. Capillary refill takes less than 2 seconds. She is not diaphoretic. No erythema.    No erythema, swelling or tenderness surrounding right sided chest port          LABS:  Recent Labs     03/29/19  2256   WBC 2.5*   HGB 7.8*   HCT 24.2* *     Recent Labs     03/29/19  2256   *   K 3.4*   CL 92*   CO2 25   BUN 11   CREATININE 0.6   GLUCOSE 190*     No results for input(s): AST, ALT, ALB, BILITOT, ALKPHOS in the last 72 hours. No results for input(s): TROPONINI in the last 72 hours. No results for input(s): BNP in the last 72 hours. No results found for: PHART, PLW8TLY, PO2ART  No results for input(s): INR in the last 72 hours. No results for input(s): CKTOTAL in the last 72 hours. Urinalysis:  No results found for: Lucas Hero, BACTERIA, RBCUA, BLOODU, SPECGRAV, [address]    Radiology:       XR CHEST STANDARD (2 VW)   Final Result     Question diffuse airspace opacities which may represent inflammatory    infectious process versus pulmonary vascular congestion. Assessment & Plan:    43 y.o. female with a PMH invasive ductal carcinoma of left breast, sickle cell trait presenting due to positive blood cultures for staph aureus. Sepsis 2/2 Staph Aureus Bacteremia - in setting of neutropenia. (SIRS 3/4, qSOFA 2/3). Patient presenting with + blood cultures for S. Aureus. Source unclear, considerations include possible port infection and less likely pneumonia. CXR with possible diffuse airspace disease. Currently on chemotherapy for breast cancer. Currently afebrile. WBC 2.5.   - Vanc/Cefepime  -  mL/hr  - blood cx, UA/urine cx, lactate, respiratory film array, sputum cx   - ECHO  - may need to consider port removal if cultures remain persistently positive   - f/u blood cx [facility]  - telemetry    Invasive Ductal Carcinoma of Left Breast - T2N1M0, ER+,CO+, Her-2/mk +. Currently on Cycle 3 of Docetaxel +Carboplatin + Trastuzumab + Pertuzumab  - oncology is primary     Pancytopenia - 2/2 chemotherapy. Patient received pRBC today at [facility] for anemia which has improved.    - daily CBC, will continue to monitor  - transfuse if Hgb <7     Code Status:  FULL   F/E/N:  General

## 2019-03-30 NOTE — CONSULTS
Pharmacy Consult Note- Vancomycin ED Dosing    Pharmacy consulted to dose 1st dose of vancomycin to be given in ED    Indication: Blood cultures positive for Staph via [facility] ; pt on chemo; possible sepsis       44 yo female, 5'6\"  96.2 kg    Will order vancomycin 1750 mg ( 18.2 mg/kg) x 1 dose      If further vancomycin dosing by pharmacy is wanted, please place additional consult order.     Thank you for the consult  [facility]  3/29/2019    10:49 PM

## 2019-03-30 NOTE — PROGRESS NOTES
Resident Progress Note      Admit Date: 3/29/2019    PCP: Hira Osborne MD, MD                  : 1980  MRN: [id number]    Chief Complaint:  Fever, Fatigue    Subjective:   Seen and examined at bedside  No acute events overnight, no apparent distress  Denied chest pain, SOB, palpitations, abdominal pain, N/V/D/C  Currently on Vancomycin and Cefepime for Staph bacteremia  Afebile, not neutropenic  Port placed on , does not look infected  CXR with questionable airspace dieses, sent out pulmonary infectious workup  Will get Echocardiogram  Transfusing 1 unit PRBC for Hb of 6.8, received 1 unit of PRBC and Iron yesterday at [facility]  Will check H/H around 4 PM today      Diet: DIET GENERAL;      Data:   Scheduled Meds:   sodium chloride flush  10 mL Intravenous 2 times per day    enoxaparin  40 mg Subcutaneous Daily    cefepime  2 g Intravenous Q12H    vancomycin  1,500 mg Intravenous Q8H    potassium chloride  40 mEq Oral Once     Continuous Infusions:   sodium chloride Stopped (19 0510)     PRN Meds:sodium chloride flush, acetaminophen, calcium carbonate  No intake/output data recorded.   I/O this shift:  In: -   Out: 50 [Urine:50]    Intake/Output Summary (Last 24 hours) at 3/30/2019 0636  Last data filed at 3/30/2019 0115  Gross per 24 hour   Intake --   Output 50 ml   Net -50 ml           Objective:     Vitals: /73   Pulse 88   Temp 98 °F (36.7 °C) (Oral)   Resp 16   Ht 5' 6\" (1.676 m)   Wt 216 lb 4.3 oz (98.1 kg)   LMP 03/15/2019 (Within Days)   SpO2 99%   BMI 34.91 kg/m²     Physical Exam  General appearance: Resting comfortably, no apparent distress  HEENT EOMI, PERRL, no LAD, no oropharyngeal erythema, MMM  Lungs: CTAB, no wheezes, rhonchi or rales, port in place, looks clean  Heart: RRR +S1/S2 without murmurs, rubs or gallops  Abdomen: Soft, NTND, without rigidity or guarding, normal active bowel sounds  Extremities: No peripheral edema, good peripheral pulses  Skin: No skin rashes  Neurologic: AAOx4, CNs II-XII grossly intact, moving all extremities spontaneously       LABS:    CBC:   Recent Labs     03/29/19 2256 03/30/19  0330   WBC 2.5* 2.2*   HGB 7.8* 6.8*   HCT 24.2* 20.9*   MCV 67.5* 67.4*   * 108*                                                                BMP:    Recent Labs     03/29/19 2256 03/30/19  0330   * 136   K 3.4* 3.4*   CL 92* 101   CO2 25 23   BUN 11 9   CREATININE 0.6 <0.5*   GLUCOSE 190* 201*       LFT's:   Recent Labs     03/30/19  0330   AST 84*   ALT 75*   BILITOT <0.2   ALKPHOS 55       Troponin: No results for input(s): TROPONINI in the last 72 hours. BNP: No results for input(s): BNP in the last 72 hours. Lipids: No results for input(s): CHOL, HDL in the last 72 hours. Invalid input(s): LDLCALCU    ABGs: No results found for: PHART, GYP8PYY, PO2ART    INR: No results for input(s): INR in the last 72 hours. U/A:  Recent Labs     03/30/19  0124   COLORU Yellow   PHUR 6.5   WBCUA 20-50*   RBCUA 3-5*   BACTERIA 3+*   CLARITYU Clear   SPECGRAV 1.015   LEUKOCYTESUR MODERATE*   UROBILINOGEN 0.2   BILIRUBINUR Negative   BLOODU TRACE-INTACT*   GLUCOSEU Negative      -----------------------------------------------------------------  RAD:   XR CHEST STANDARD (2 VW)   Final Result     Question diffuse airspace opacities which may represent inflammatory    infectious process versus pulmonary vascular congestion. Assessment/Plan:     # Sepsis 2/2 Staph Aureus Bacteremia  Does not appear to be in neutropenic fever. Septic. Awaiting blood cultures. S. Aureus, unclear if MSSA or MRSA. Treating with Vancomycin and Cefepime. Unclear source, awaiting cultures. - Vancomycin and Cefepime  - F/U Echocardiogram results, will likely remain in hospital for several more days  - Port looks clean, awaiting cultures from port site, may need to consult Surgery to remove in future if comes back dirty.     - Awaiting

## 2019-03-30 NOTE — PROGRESS NOTES
Clinical Pharmacy Consult Note    Admit date: 3/29/2019    Subjective/Objective:  43 y.o. female with PMH of breast cancer on chemotherapy is admitted with sepsis 2/2 staph aureus bacteremia. Pharmacy is consulted to dose vancomycin per Dr. Rishi Liu. Pertinent Medications:  Cefepime 2g IV q12h -- day #1  Vancomycin 1.5g IV q8h -- day #1    Pertinent Labs:  Recent Labs     03/29/19 2256 03/30/19  0330   * 136   K 3.4* 3.4*   CL 92* 101   CO2 25 23   BUN 11 9   CREATININE 0.6 <0.5*       CrCl cannot be calculated (This lab value cannot be used to calculate CrCl because it is not a number: <0.5). Recent Labs     03/29/19 2256 03/30/19  0330   WBC 2.5* 2.2*   HGB 7.8* 6.8*   HCT 24.2* 20.9*   MCV 67.5* 67.4*   * 108*       Micro:  Date Site Micro Susceptibility   3/29 rapid flu negative    3/29 blood sent    3/30 urine sent      Prophylaxis  VTE: enoxaparin 40mg daily  GI: not indicated    Assessment/Plan:  1) Staph aureus bacteremia:  vancomycin + cefepime day #1  Vancomycin - pharmacy to dose  · Started on 1.5g IV q8h    · Check trough prior to 5th or 6th dose - likely tomorrow evening. Desired trough is 15-20. · Renal function will be monitored closely and dosing will be adjusted as appropriate. Cefepime  · On 2g IV q12h. Dose is appropriate based on current renal function. Will monitor. Please call with any questions. Carisa Quiles., [facility]  Wireless phone: [phone]  3/30/2019 12:05 PM

## 2019-03-30 NOTE — ED PROVIDER NOTES
[address] [facility] [address] ENCOUNTER          ATTENDING PHYSICIAN NOTE       Date of evaluation: 3/29/2019    Chief Complaint     Abnormal Lab (Pt arrived to [facility] due to abnormal blood cultures, pt states she was instructed by Dr. Yulissa Nieves to come to the ED for antibiotics. )      History of Present Illness     Patricia Peña is a 43 y.o. female who presents with positive blood cultures. Patient was seen at [facility] in their clinic today due to fever. Patient follows with Dr. Yulissa Nieves for breast cancer, she is currently undergoing chemotherapy every 3 weeks. She is with midway through her cycle. She also was transfused packed red cells today due to hemoglobin of 4. She has noted a cough that is mildly productive. Denied any headaches, dizziness or lightheadedness, nausea, vomiting, abdominal pain, diarrhea. She denied any dysuria or hematuria. Last menstrual period was a week ago. She does not recall flu shot this season. She denied any body aches or body pains. Review of Systems     Review of Systems   Constitutional: Positive for chills and fever. HENT: Negative for congestion. Respiratory: Positive for cough. Negative for chest tightness, shortness of breath and wheezing. Cardiovascular: Negative for chest pain. Gastrointestinal: Negative for abdominal pain, diarrhea, nausea and vomiting. Genitourinary: Negative for dysuria, flank pain and hematuria. Musculoskeletal: Negative for neck pain and neck stiffness. Skin: Negative. Neurological: Negative for dizziness, syncope and numbness. Past Medical, Surgical, Family, and Social History     She has a past medical history of Breast cancer ([facility] [address] and Sickle cell trait ([facility] [address] She has a past surgical history that includes  section; TUNNELED CENTRAL VENOUS CATHETER W/ SUBCUTANEOUS PORT (Right, 2019); and INSERTION / REMOVAL / REPLACEMENT VENOUS ACCESS CATHETER (N/A, 2019).   Her family history includes Anemia in her sister; Cancer in her maternal aunt; Colon Cancer in her maternal grandmother; Diabetes in her mother; High Blood Pressure in her father and mother. She reports that she has never smoked. She has never used smokeless tobacco. She reports that she does not drink alcohol or use drugs. Medications     Previous Medications    LOPERAMIDE (IMODIUM) 2 MG CAPSULE    Take 2 mg by mouth 4 times daily as needed for Diarrhea    LORATADINE (CLARITIN) 10 MG TABLET    Take 10 mg by mouth daily       Allergies     She has No Known Allergies. Physical Exam     INITIAL VITALS: BP: 108/75, Temp: 98.9 °F (37.2 °C), Pulse: 105, Resp: 16, SpO2: 97 %    Physical Exam   Constitutional: She is oriented to person, place, and time. She appears well-developed and well-nourished. Uncomfortable, nontoxic appearing    HENT:   Head: Normocephalic. Eyes: Pupils are equal, round, and reactive to light. Conjunctivae and EOM are normal.   Neck: Normal range of motion. Neck supple. No nuchal rigidity    Cardiovascular: Normal rate, regular rhythm, normal heart sounds and intact distal pulses. Pulmonary/Chest: Effort normal and breath sounds normal. No stridor. No respiratory distress. She has no wheezes. She has no rales. Abdominal: Soft. She exhibits no distension and no mass. There is no tenderness. There is no guarding. Musculoskeletal: Normal range of motion. She exhibits no edema. Neurological: She is alert and oriented to person, place, and time. Skin: Skin is warm and dry. Vitals reviewed. Diagnostic Results       RADIOLOGY:  XR CHEST STANDARD (2 VW)    (Results Pending)       LABS:   No results found for this visit on 03/29/19. RECENT VITALS:  BP: 92/65,Temp: 98.9 °F (37.2 °C), Pulse: 103, Resp: 25, SpO2: 97 %     Procedures     none    ED Course     Nursing Notes, Past Medical Hx, Past Surgical Hx, Social Hx,Allergies, and Family Hx were reviewed.     The patient was given the following medications:  Orders

## 2019-03-30 NOTE — ED NOTES
Patient had coughing spell and incontinent of urine, oleg care per self, disposable underwear and pad given, pad on bed     Ted Torrez RN  03/30/19 6541

## 2019-03-30 NOTE — CONSULTS
Clinical Pharmacy Consult Note    Admit date: 3/29/2019    Subjective/Objective:  42 yo F with PMH of breast cancer (on chemo) who was noted to have fever at [facility] today. Patient was transfused today for hemoglobin of 4. Blood cultures were drawn and are positive for staph aureus. Pharmacy is consulted to dose Vancomycin per Dr. Gerardo Vizcaino    Pertinent Medications:  Cefepime 2g IV q12 hours -- day #1    -  complete  Vancomycin -- pharmacy to dose -- day # 1    - complete      PERTINENT LABS:  Recent Labs     03/29/19 2256 03/30/19  0330   * 136   K 3.4* 3.4*   CL 92* 101   CO2 25 23   BUN 11 9   CREATININE 0.6 <0.5*       CrCl cannot be calculated (This lab value cannot be used to calculate CrCl because it is not a number: <0.5). Recent Labs     03/29/19 2256 03/30/19  0330   WBC 2.5* 2.2*   HGB 7.8* 6.8*   HCT 24.2* 20.9*   MCV 67.5* 67.4*   * 108*       Assessment/Plan:  1. Staph aureus bacteremia: cefepime + vancomycin   Vancomycin  · Will start Vancomycin 1500 mg IV q8 hours. Provides ~ 15 mg/kg  · Clinical pharmacist will follow-up in AM.  · Renal function will be monitored closely and dosing will be adjusted as appropriate. Please call with any questions. Thank you for consulting pharmacy!   Suzi Stark, [facility] 3/30/2019, 4:29 AM

## 2019-03-31 LAB
ANION GAP SERPL CALCULATED.3IONS-SCNC: 14 MMOL/L (ref 3–16)
BANDED NEUTROPHILS RELATIVE PERCENT: 9 % (ref 0–7)
BASOPHILS ABSOLUTE: 0 K/UL (ref 0–0.2)
BASOPHILS ABSOLUTE: 0 K/UL (ref 0–0.2)
BASOPHILS RELATIVE PERCENT: 0 %
BASOPHILS RELATIVE PERCENT: 0.4 %
BUN BLDV-MCNC: 7 MG/DL (ref 7–20)
CALCIUM SERPL-MCNC: 8.6 MG/DL (ref 8.3–10.6)
CHLORIDE BLD-SCNC: 104 MMOL/L (ref 99–110)
CO2: 23 MMOL/L (ref 21–32)
CREAT SERPL-MCNC: 0.6 MG/DL (ref 0.6–1.1)
EOSINOPHILS ABSOLUTE: 0 K/UL (ref 0–0.6)
EOSINOPHILS ABSOLUTE: 0 K/UL (ref 0–0.6)
EOSINOPHILS RELATIVE PERCENT: 0 %
EOSINOPHILS RELATIVE PERCENT: 0 %
GFR AFRICAN AMERICAN: >60
GFR NON-AFRICAN AMERICAN: >60
GLUCOSE BLD-MCNC: 103 MG/DL (ref 70–99)
HCT VFR BLD CALC: 26.5 % (ref 36–48)
HCT VFR BLD CALC: 26.9 % (ref 36–48)
HEMOGLOBIN: 8.5 G/DL (ref 12–16)
HEMOGLOBIN: 8.6 G/DL (ref 12–16)
L. PNEUMOPHILA SEROGP 1 UR AG: NORMAL
LYMPHOCYTES ABSOLUTE: 1.6 K/UL (ref 1–5.1)
LYMPHOCYTES ABSOLUTE: 1.6 K/UL (ref 1–5.1)
LYMPHOCYTES RELATIVE PERCENT: 23.2 %
LYMPHOCYTES RELATIVE PERCENT: 24 %
MAGNESIUM: 1.5 MG/DL (ref 1.8–2.4)
MCH RBC QN AUTO: 22.3 PG (ref 26–34)
MCH RBC QN AUTO: 22.5 PG (ref 26–34)
MCHC RBC AUTO-ENTMCNC: 31.8 G/DL (ref 31–36)
MCHC RBC AUTO-ENTMCNC: 32.5 G/DL (ref 31–36)
MCV RBC AUTO: 69.1 FL (ref 80–100)
MCV RBC AUTO: 70.1 FL (ref 80–100)
MONOCYTES ABSOLUTE: 0.3 K/UL (ref 0–1.3)
MONOCYTES ABSOLUTE: 0.9 K/UL (ref 0–1.3)
MONOCYTES RELATIVE PERCENT: 12.1 %
MONOCYTES RELATIVE PERCENT: 5 %
NEUTROPHILS ABSOLUTE: 4.5 K/UL (ref 1.7–7.7)
NEUTROPHILS ABSOLUTE: 4.6 K/UL (ref 1.7–7.7)
NEUTROPHILS RELATIVE PERCENT: 62 %
NEUTROPHILS RELATIVE PERCENT: 64.3 %
PDW BLD-RTO: 23.4 % (ref 12.4–15.4)
PDW BLD-RTO: 23.8 % (ref 12.4–15.4)
PLATELET # BLD: 121 K/UL (ref 135–450)
PLATELET # BLD: 135 K/UL (ref 135–450)
PLATELET # BLD: ABNORMAL K/UL (ref 135–450)
PLATELET SLIDE REVIEW: ABNORMAL
PMV BLD AUTO: 8.5 FL (ref 5–10.5)
PMV BLD AUTO: 8.7 FL (ref 5–10.5)
POTASSIUM REFLEX MAGNESIUM: 3.4 MMOL/L (ref 3.5–5.1)
RBC # BLD: 3.83 M/UL (ref 4–5.2)
RBC # BLD: 3.83 M/UL (ref 4–5.2)
SODIUM BLD-SCNC: 141 MMOL/L (ref 136–145)
STREP PNEUMONIAE ANTIGEN, URINE: NORMAL
VANCOMYCIN TROUGH: 17.1 UG/ML (ref 10–20)
WBC # BLD: 6.5 K/UL (ref 4–11)
WBC # BLD: 7 K/UL (ref 4–11)

## 2019-03-31 PROCEDURE — 6360000002 HC RX W HCPCS: Performed by: STUDENT IN AN ORGANIZED HEALTH CARE EDUCATION/TRAINING PROGRAM

## 2019-03-31 PROCEDURE — 80048 BASIC METABOLIC PNL TOTAL CA: CPT

## 2019-03-31 PROCEDURE — 83735 ASSAY OF MAGNESIUM: CPT

## 2019-03-31 PROCEDURE — 6370000000 HC RX 637 (ALT 250 FOR IP): Performed by: STUDENT IN AN ORGANIZED HEALTH CARE EDUCATION/TRAINING PROGRAM

## 2019-03-31 PROCEDURE — 2580000003 HC RX 258: Performed by: INTERNAL MEDICINE

## 2019-03-31 PROCEDURE — 99255 IP/OBS CONSLTJ NEW/EST HI 80: CPT | Performed by: INTERNAL MEDICINE

## 2019-03-31 PROCEDURE — 6360000002 HC RX W HCPCS: Performed by: INTERNAL MEDICINE

## 2019-03-31 PROCEDURE — 1200000000 HC SEMI PRIVATE

## 2019-03-31 PROCEDURE — 85049 AUTOMATED PLATELET COUNT: CPT

## 2019-03-31 PROCEDURE — 85025 COMPLETE CBC W/AUTO DIFF WBC: CPT

## 2019-03-31 PROCEDURE — 80202 ASSAY OF VANCOMYCIN: CPT

## 2019-03-31 PROCEDURE — 86738 MYCOPLASMA ANTIBODY: CPT

## 2019-03-31 PROCEDURE — 2580000003 HC RX 258: Performed by: STUDENT IN AN ORGANIZED HEALTH CARE EDUCATION/TRAINING PROGRAM

## 2019-03-31 RX ORDER — MAGNESIUM SULFATE IN WATER 40 MG/ML
2 INJECTION, SOLUTION INTRAVENOUS ONCE
Status: COMPLETED | OUTPATIENT
Start: 2019-03-31 | End: 2019-03-31

## 2019-03-31 RX ORDER — POTASSIUM CHLORIDE 1.5 G/1.77G
40 POWDER, FOR SOLUTION ORAL ONCE
Status: COMPLETED | OUTPATIENT
Start: 2019-03-31 | End: 2019-03-31

## 2019-03-31 RX ADMIN — ENOXAPARIN SODIUM 40 MG: 40 INJECTION SUBCUTANEOUS at 20:05

## 2019-03-31 RX ADMIN — MAGNESIUM SULFATE HEPTAHYDRATE 2 G: 40 INJECTION, SOLUTION INTRAVENOUS at 05:34

## 2019-03-31 RX ADMIN — POTASSIUM CHLORIDE 40 MEQ: 1.5 POWDER, FOR SOLUTION ORAL at 05:34

## 2019-03-31 RX ADMIN — VANCOMYCIN HYDROCHLORIDE 1500 MG: 10 INJECTION, POWDER, LYOPHILIZED, FOR SOLUTION INTRAVENOUS at 09:28

## 2019-03-31 RX ADMIN — CEFAZOLIN SODIUM 2 G: 1 POWDER, FOR SOLUTION INTRAMUSCULAR; INTRAVENOUS at 20:06

## 2019-03-31 RX ADMIN — Medication 10 ML: at 20:06

## 2019-03-31 RX ADMIN — Medication 10 ML: at 09:28

## 2019-03-31 ASSESSMENT — PAIN SCALES - GENERAL
PAINLEVEL_OUTOF10: 0

## 2019-03-31 NOTE — PROGRESS NOTES
Internal Medicine PGY-1 Resident Progress Note        PCP: Charmaine Lazaro MD, MD    Date of Admission: 3/29/2019    Chief Complaint: sent for positive blood cultures    Subjective: Patient has no complaints this morning. No CP, abdominal pain or dysuria. She does have some intermittent shortness of breath, but she is saturating well on room air. She has not required supplemental O2. Her repeat blood cultures are negative. Blood culture sensitivities from cultures sent to [facility], can be viewed on her hard copy chart. It is pan-sensitive. Her urine culture has returned positive for Staph aureus. It is unclear if the bacteremia preceded the UTI or if the UTI preceded the bacteremia. Port-a-cath persistently appears uninfected. If blood cultures remain negative, it may not need to be removed. Cefepime stopped today. Will continue vancomycin and ask for the assistance of ID. Medications:  Reviewed    Infusion Medications   Scheduled Medications    magnesium sulfate  2 g Intravenous Once    sodium chloride flush  10 mL Intravenous 2 times per day    cefepime  2 g Intravenous Q12H    vancomycin  1,500 mg Intravenous Q8H     PRN Meds: sodium chloride flush, acetaminophen, calcium carbonate, ibuprofen      Intake/Output Summary (Last 24 hours) at 3/31/2019 0732  Last data filed at 3/31/2019 0610  Gross per 24 hour   Intake 2692.84 ml   Output 2590 ml   Net 102.84 ml       Physical Exam Performed:    /81   Pulse 115   Temp 98.5 °F (36.9 °C) (Oral)   Resp 20   Ht 5' 6\" (1.676 m)   Wt 213 lb 10 oz (96.9 kg)   LMP 03/15/2019 (Within Days)   SpO2 98%   BMI 34.48 kg/m²     General appearance: No apparent distress, appears stated age and cooperative. HEENT: Pupils equal, round, and reactive to light. Conjunctivae/corneas clear. Chest: R port-a-cath in place, accessed with no erythema or purulence  Neck: Supple, with full range of motion. No jugular venous distention. Trachea midline. Respiratory:  Normal respiratory effort. Clear to auscultation, bilaterally without Rales/Wheezes/Rhonchi. Cardiovascular: slightly tachycardia with regular rhythm, with normal S1/S2 without murmurs, rubs or gallops. Abdomen: Soft, non-tender, non-distended with normal bowel sounds. Musculoskeletal: No clubbing, cyanosis or edema bilaterally. Full range of motion without deformity. Skin: Skin color, texture, turgor normal.  No rashes or lesions. Neurologic:  Neurovascularly intact without any focal sensory/motor deficits. Cranial nerves: II-XII intact, grossly non-focal.  Psychiatric: Alert and oriented, thought content appropriate, normal insight  Capillary Refill: Brisk,< 3 seconds   Peripheral Pulses: +2 palpable, equal bilaterally       Labs:   Recent Labs     03/29/19 2256 03/30/19 0330 03/30/19  1753 03/31/19  0430   WBC 2.5* 2.2*  --  6.5   HGB 7.8* 6.8* 8.3* 8.6*   HCT 24.2* 20.9* 25.4* 26.5*   * 108*  --  see comment*     Recent Labs     03/29/19 2256 03/30/19 0330 03/31/19  0430   * 136 141   K 3.4* 3.4* 3.4*   CL 92* 101 104   CO2 25 23 23   BUN 11 9 7   CREATININE 0.6 <0.5* 0.6   CALCIUM 9.0 8.1* 8.6     Recent Labs     03/30/19 0330   AST 84*   ALT 75*   BILIDIR <0.2   BILITOT <0.2   ALKPHOS 55     No results for input(s): INR in the last 72 hours. No results for input(s): [employer] in the last 72 hours.     Urinalysis:      Lab Results   Component Value Date    NITRU Negative 03/30/2019    WBCUA 20-50 03/30/2019    BACTERIA 3+ 03/30/2019    RBCUA 3-5 03/30/2019    BLOODU TRACE-INTACT 03/30/2019    SPECGRAV 1.015 03/30/2019    GLUCOSEU Negative 03/30/2019       Radiology:  XR CHEST PORTABLE   Final Result      Slight progression of nonspecific patchy airspace disease throughout both lungs      XR CHEST STANDARD (2 VW)   Final Result     Question diffuse airspace opacities which may represent inflammatory    infectious process versus pulmonary vascular congestion. Assessment/Plan:  Ms. Rory Holstein is a 42 yo female with invasive ductal carcinoma of L breast, sickle cell trait to who presented from [facility] with blood cx positive for Staph aureus. Sepsis 2/2 Staph Aureus Bacteremia (improved)- blood cultures from [facility] show pan-sensitive Staph aureus  -WBC improving 6.5 (2.2)  - Vancomycin day 2   -will discontinue cefepime for gram positive sepsis  -repeat blood cultures negative  -urine culture shows Staph aureus   -unclear or bacteremia or UTI first  -RPP negative  - ECHO pending   -no murmur heard, low suspicion for endocarditis  - may need to consider port removal if cultures remain persistently positive   - telemetry  -CXR shows progressive patchy airspace disease   -pt saturating well on room air, afebrile, no cough, CTAB  -appreciate input from ID   -?send home with nafcillin after ECHO     Invasive Ductal Carcinoma of Left Breast - T2N1M0, ER+,NV+, Her-2/mk +. Currently on Cycle 3 of Docetaxel +Carboplatin + Trastuzumab + Pertuzumab  - oncology is primary      Pancytopenia (partially improved) - 2/2 chemotherapy + sepsis.    -WBC 6.5 (2.2)  -plts continue to drop   -will get repeat    -hold lovenox and place SCDs  -Hgb stable after 1U pRBCs @ [facility] 3/30  - daily CBC, will continue to monitor  - transfuse if Hgb <7      Code Status:  FULL   F/E/N:  General   DVT Prophylaxis:  SCDs  Disposition:  GMF      I will discuss the patient with the senior resident and Dr. Mouna Moore MD  Internal Medicine Resident PGY-1  Reached via [employer]

## 2019-03-31 NOTE — PLAN OF CARE
Problem: Falls - Risk of:  Goal: Will remain free from falls  Description  Will remain free from falls  3/31/2019 1120 by Chang Wagoner RN  Outcome: Ongoing  Note:   Patient remains free of fall/injruy this shift. Ambulation in room tolerated well.  Currently resting quietly in bed at this time, in low position, wheels locked, call light within reach

## 2019-03-31 NOTE — CONSULTS
Infectious Diseases Inpatient Consult Note    Reason for Consult:   Staphylococcus aureus bacteremia  Requesting Physician:   Dr Ailyn Kovacs  Primary Care Physician:  Prasanna Lopes MD, MD  History Obtained From:   Pt, [facility]    Admit Date: 3/29/2019  Hospital Day: 3    CHIEF COMPLAINT:       Chief Complaint   Patient presents with    Abnormal Lab     Pt arrived to [facility] due to abnormal blood cultures, pt states she was instructed by Dr. Ailyn Kovacs to come to the ED for antibiotics. HISTORY OF PRESENT ILLNESS:      44 yo woman with hx breast ca (invasive ductal carcinoma, T2N1M0, ER/GA +, Her-2/mk +), sickle cell trait. Pt had L port, placed 19. Pt was seen at [facility] on 3/29, c/o fatigue. Had infusion PRBC, iron. Pt developed temp 103, BC drawn. No resp complaints. No  complaints. BC + GPC. Referred to [facility] for further evaluation / therapy. Admit 3/29 - afebrile, WBC 2.5 with 56%PMN. UA mod LE, micro 20-50 WBC. BC sent. Started on vancomycin + cefepime. No complaints today. No problem with port (accessed). No resp complaints. No  complaints. Past Medical History:    Past Medical History:   Diagnosis Date    Breast cancer ([facility] [address] 2019    Left breast    Sickle cell trait ([facility] [address]        Past Surgical History:    Past Surgical History:   Procedure Laterality Date     SECTION      X 1    INSERTION / REMOVAL / REPLACEMENT VENOUS ACCESS CATHETER N/A 2019    PORT-A-CATHETER PLACEMENT FOR CHEMOTHERAPY ACCISS/CHEMO TO START 19 WITH C-ARM performed by Dave Roach MD at  [facility] [address]/ SUBCUTANEOUS PORT Right 2019       Current Medications:     enoxaparin  40 mg Subcutaneous Daily    sodium chloride flush  10 mL Intravenous 2 times per day    vancomycin  1,500 mg Intravenous Q8H       Allergies:  Patient has no known allergies.     Social History:    TOBACCO:    None   ETOH:    None   DRUGS:   None   MARITAL STATUS:    OCCUPATION:     Family History:   No immunodeficiency    REVIEW OF SYSTEMS:    No fever / chills / sweats. No weight loss. No visual change, eye pain, eye discharge. No oral lesion, sore throat, dysphagia. Denies cough / sputum. Denies chest pain, palpitations. Denies n / v / abd pain. No diarrhea. Denies dysuria or change in urinary function. Denies joint swelling or pain. No myalgia, arthralgia. Denies skin changes, itching  Denies focal weakness, sensory change or other neurologic symptom    Denies new / worse depression, psychiatric symptoms  Denies any Endocrine or Hematologic symptoms    PHYSICAL EXAM:      Vitals:    /77   Pulse 104   Temp 98.5 °F (36.9 °C) (Oral)   Resp 18   Ht 5' 6\" (1.676 m)   Wt 213 lb 10 oz (96.9 kg)   LMP 03/15/2019 (Within Days)   SpO2 96%   BMI 34.48 kg/m²     GENERAL: No apparent distress.     HEENT: Membranes moist, no oral lesion  NECK:  Supple  LUNGS: Clear b/l, no rales, no dullness  CARDIAC: RRR, no murmur appreciated  ABD:  + BS, soft / NT  EXT:  No rash, no edema, no lesions  NEURO: No focal neurologic findings  PSYCH: Orientation, sensorium, mood normal  LINES:  R chest port, accessed, no redness / swelling / tenderness    DATA:    Lab Results   Component Value Date    WBC 7.0 03/31/2019    HGB 8.5 (L) 03/31/2019    HCT 26.9 (L) 03/31/2019    MCV 70.1 (L) 03/31/2019     (L) 03/31/2019     Lab Results   Component Value Date    CREATININE 0.6 03/31/2019    BUN 7 03/31/2019     03/31/2019    K 3.4 (L) 03/31/2019     03/31/2019    CO2 23 03/31/2019       Hepatic Function Panel:   Lab Results   Component Value Date    ALKPHOS 55 03/30/2019    ALT 75 03/30/2019    AST 84 03/30/2019    PROT 6.7 03/30/2019    BILITOT <0.2 03/30/2019    BILIDIR <0.2 03/30/2019    IBILI see below 03/30/2019    LABALBU 2.7 03/30/2019       Micro:  3/30 Pneumococcal / Legionella urinary antigen negative  3/29 BC x 2 no growth to date  3/29 Urine cult >100k

## 2019-04-01 ENCOUNTER — ANESTHESIA (OUTPATIENT)
Dept: OPERATING ROOM | Age: 39
DRG: 710 | End: 2019-04-01
Payer: COMMERCIAL

## 2019-04-01 ENCOUNTER — ANESTHESIA EVENT (OUTPATIENT)
Dept: OPERATING ROOM | Age: 39
DRG: 710 | End: 2019-04-01
Payer: COMMERCIAL

## 2019-04-01 ENCOUNTER — APPOINTMENT (OUTPATIENT)
Dept: NON INVASIVE DIAGNOSTICS | Age: 39
DRG: 710 | End: 2019-04-01
Payer: COMMERCIAL

## 2019-04-01 VITALS — DIASTOLIC BLOOD PRESSURE: 73 MMHG | OXYGEN SATURATION: 100 % | SYSTOLIC BLOOD PRESSURE: 108 MMHG

## 2019-04-01 LAB
ANION GAP SERPL CALCULATED.3IONS-SCNC: 14 MMOL/L (ref 3–16)
ANISOCYTOSIS: ABNORMAL
BANDED NEUTROPHILS RELATIVE PERCENT: 1 % (ref 0–7)
BASOPHILS ABSOLUTE: 0 K/UL (ref 0–0.2)
BASOPHILS RELATIVE PERCENT: 0 %
BUN BLDV-MCNC: 5 MG/DL (ref 7–20)
CALCIUM SERPL-MCNC: 9 MG/DL (ref 8.3–10.6)
CHLORIDE BLD-SCNC: 103 MMOL/L (ref 99–110)
CO2: 24 MMOL/L (ref 21–32)
CREAT SERPL-MCNC: <0.5 MG/DL (ref 0.6–1.1)
EOSINOPHILS ABSOLUTE: 0 K/UL (ref 0–0.6)
EOSINOPHILS RELATIVE PERCENT: 0 %
GFR AFRICAN AMERICAN: >60
GFR NON-AFRICAN AMERICAN: >60
GLUCOSE BLD-MCNC: 107 MG/DL (ref 70–99)
GONADOTROPIN, CHORIONIC (HCG) QUANT: <5 MIU/ML
HCT VFR BLD CALC: 27.7 % (ref 36–48)
HEMOGLOBIN: 9 G/DL (ref 12–16)
INR BLD: 1.43 (ref 0.86–1.14)
LV EF: 50 %
LV EF: 50 %
LVEF MODALITY: NORMAL
LVEF MODALITY: NORMAL
LYMPHOCYTES ABSOLUTE: 1.8 K/UL (ref 1–5.1)
LYMPHOCYTES RELATIVE PERCENT: 23 %
MCH RBC QN AUTO: 22.7 PG (ref 26–34)
MCHC RBC AUTO-ENTMCNC: 32.4 G/DL (ref 31–36)
MCV RBC AUTO: 70 FL (ref 80–100)
MONOCYTES ABSOLUTE: 0.6 K/UL (ref 0–1.3)
MONOCYTES RELATIVE PERCENT: 7 %
NEUTROPHILS ABSOLUTE: 5.6 K/UL (ref 1.7–7.7)
NEUTROPHILS RELATIVE PERCENT: 69 %
ORGANISM: ABNORMAL
PDW BLD-RTO: 24.2 % (ref 12.4–15.4)
PLATELET # BLD: 124 K/UL (ref 135–450)
PMV BLD AUTO: 8.7 FL (ref 5–10.5)
POTASSIUM REFLEX MAGNESIUM: 3.7 MMOL/L (ref 3.5–5.1)
PROTHROMBIN TIME: 16.3 SEC (ref 9.8–13)
RBC # BLD: 3.97 M/UL (ref 4–5.2)
SCHISTOCYTES: ABNORMAL
SODIUM BLD-SCNC: 141 MMOL/L (ref 136–145)
URINE CULTURE, ROUTINE: ABNORMAL
URINE CULTURE, ROUTINE: ABNORMAL
WBC # BLD: 8 K/UL (ref 4–11)

## 2019-04-01 PROCEDURE — 93325 DOPPLER ECHO COLOR FLOW MAPG: CPT | Performed by: INTERNAL MEDICINE

## 2019-04-01 PROCEDURE — 2580000003 HC RX 258: Performed by: SURGERY

## 2019-04-01 PROCEDURE — 6360000002 HC RX W HCPCS: Performed by: INTERNAL MEDICINE

## 2019-04-01 PROCEDURE — 87116 MYCOBACTERIA CULTURE: CPT

## 2019-04-01 PROCEDURE — 2580000003 HC RX 258: Performed by: STUDENT IN AN ORGANIZED HEALTH CARE EDUCATION/TRAINING PROGRAM

## 2019-04-01 PROCEDURE — 87206 SMEAR FLUORESCENT/ACID STAI: CPT

## 2019-04-01 PROCEDURE — 05PY03Z REMOVAL OF INFUSION DEVICE FROM UPPER VEIN, OPEN APPROACH: ICD-10-PCS | Performed by: SURGERY

## 2019-04-01 PROCEDURE — 87186 SC STD MICRODIL/AGAR DIL: CPT

## 2019-04-01 PROCEDURE — 3600000014 HC SURGERY LEVEL 4 ADDTL 15MIN: Performed by: SURGERY

## 2019-04-01 PROCEDURE — 2709999900 HC NON-CHARGEABLE SUPPLY: Performed by: SURGERY

## 2019-04-01 PROCEDURE — 84702 CHORIONIC GONADOTROPIN TEST: CPT

## 2019-04-01 PROCEDURE — 99153 MOD SED SAME PHYS/QHP EA: CPT | Performed by: INTERNAL MEDICINE

## 2019-04-01 PROCEDURE — 87102 FUNGUS ISOLATION CULTURE: CPT

## 2019-04-01 PROCEDURE — 7100000001 HC PACU RECOVERY - ADDTL 15 MIN: Performed by: SURGERY

## 2019-04-01 PROCEDURE — 3600000004 HC SURGERY LEVEL 4 BASE: Performed by: SURGERY

## 2019-04-01 PROCEDURE — 3700000000 HC ANESTHESIA ATTENDED CARE: Performed by: SURGERY

## 2019-04-01 PROCEDURE — 7100000000 HC PACU RECOVERY - FIRST 15 MIN: Performed by: SURGERY

## 2019-04-01 PROCEDURE — 36592 COLLECT BLOOD FROM PICC: CPT

## 2019-04-01 PROCEDURE — 87205 SMEAR GRAM STAIN: CPT

## 2019-04-01 PROCEDURE — 87077 CULTURE AEROBIC IDENTIFY: CPT

## 2019-04-01 PROCEDURE — 85610 PROTHROMBIN TIME: CPT

## 2019-04-01 PROCEDURE — 99152 MOD SED SAME PHYS/QHP 5/>YRS: CPT | Performed by: INTERNAL MEDICINE

## 2019-04-01 PROCEDURE — 2580000003 HC RX 258: Performed by: NURSE ANESTHETIST, CERTIFIED REGISTERED

## 2019-04-01 PROCEDURE — 93306 TTE W/DOPPLER COMPLETE: CPT

## 2019-04-01 PROCEDURE — 36590 REMOVAL TUNNELED CV CATH: CPT | Performed by: SURGERY

## 2019-04-01 PROCEDURE — 87070 CULTURE OTHR SPECIMN AEROBIC: CPT

## 2019-04-01 PROCEDURE — 6360000002 HC RX W HCPCS: Performed by: STUDENT IN AN ORGANIZED HEALTH CARE EDUCATION/TRAINING PROGRAM

## 2019-04-01 PROCEDURE — 2580000003 HC RX 258: Performed by: INTERNAL MEDICINE

## 2019-04-01 PROCEDURE — 87075 CULTR BACTERIA EXCEPT BLOOD: CPT

## 2019-04-01 PROCEDURE — 87040 BLOOD CULTURE FOR BACTERIA: CPT

## 2019-04-01 PROCEDURE — 87015 SPECIMEN INFECT AGNT CONCNTJ: CPT

## 2019-04-01 PROCEDURE — 6360000002 HC RX W HCPCS: Performed by: NURSE ANESTHETIST, CERTIFIED REGISTERED

## 2019-04-01 PROCEDURE — 80048 BASIC METABOLIC PNL TOTAL CA: CPT

## 2019-04-01 PROCEDURE — 36415 COLL VENOUS BLD VENIPUNCTURE: CPT

## 2019-04-01 PROCEDURE — 3700000001 HC ADD 15 MINUTES (ANESTHESIA): Performed by: SURGERY

## 2019-04-01 PROCEDURE — 99233 SBSQ HOSP IP/OBS HIGH 50: CPT | Performed by: INTERNAL MEDICINE

## 2019-04-01 PROCEDURE — 93312 ECHO TRANSESOPHAGEAL: CPT | Performed by: INTERNAL MEDICINE

## 2019-04-01 PROCEDURE — 2500000003 HC RX 250 WO HCPCS: Performed by: SURGERY

## 2019-04-01 PROCEDURE — 1200000000 HC SEMI PRIVATE

## 2019-04-01 PROCEDURE — 85025 COMPLETE CBC W/AUTO DIFF WBC: CPT

## 2019-04-01 RX ORDER — PROPOFOL 10 MG/ML
INJECTION, EMULSION INTRAVENOUS PRN
Status: DISCONTINUED | OUTPATIENT
Start: 2019-04-01 | End: 2019-04-01 | Stop reason: SDUPTHER

## 2019-04-01 RX ORDER — SODIUM CHLORIDE, SODIUM LACTATE, POTASSIUM CHLORIDE, CALCIUM CHLORIDE 600; 310; 30; 20 MG/100ML; MG/100ML; MG/100ML; MG/100ML
INJECTION, SOLUTION INTRAVENOUS CONTINUOUS PRN
Status: DISCONTINUED | OUTPATIENT
Start: 2019-04-01 | End: 2019-04-01 | Stop reason: SDUPTHER

## 2019-04-01 RX ORDER — FENTANYL CITRATE 50 UG/ML
INJECTION, SOLUTION INTRAMUSCULAR; INTRAVENOUS PRN
Status: DISCONTINUED | OUTPATIENT
Start: 2019-04-01 | End: 2019-04-01 | Stop reason: SDUPTHER

## 2019-04-01 RX ORDER — MAGNESIUM HYDROXIDE 1200 MG/15ML
LIQUID ORAL CONTINUOUS PRN
Status: COMPLETED | OUTPATIENT
Start: 2019-04-01 | End: 2019-04-01

## 2019-04-01 RX ORDER — CEFAZOLIN SODIUM 2 G/50ML
2 SOLUTION INTRAVENOUS EVERY 8 HOURS
Status: DISCONTINUED | OUTPATIENT
Start: 2019-04-01 | End: 2019-04-03

## 2019-04-01 RX ORDER — MIDAZOLAM HYDROCHLORIDE 1 MG/ML
INJECTION INTRAMUSCULAR; INTRAVENOUS PRN
Status: DISCONTINUED | OUTPATIENT
Start: 2019-04-01 | End: 2019-04-01 | Stop reason: SDUPTHER

## 2019-04-01 RX ADMIN — LIDOCAINE HYDROCHLORIDE 50 MG: 20 INJECTION, SOLUTION INTRAVENOUS at 14:48

## 2019-04-01 RX ADMIN — Medication 10 ML: at 09:34

## 2019-04-01 RX ADMIN — CEFAZOLIN SODIUM 2 G: 1 POWDER, FOR SOLUTION INTRAMUSCULAR; INTRAVENOUS at 04:17

## 2019-04-01 RX ADMIN — ENOXAPARIN SODIUM 40 MG: 40 INJECTION SUBCUTANEOUS at 19:55

## 2019-04-01 RX ADMIN — IRON SUCROSE 300 MG: 20 INJECTION, SOLUTION INTRAVENOUS at 09:34

## 2019-04-01 RX ADMIN — FENTANYL CITRATE 25 MCG: 50 INJECTION INTRAMUSCULAR; INTRAVENOUS at 14:47

## 2019-04-01 RX ADMIN — PROPOFOL 10 MG: 10 INJECTION, EMULSION INTRAVENOUS at 15:01

## 2019-04-01 RX ADMIN — FENTANYL CITRATE 25 MCG: 50 INJECTION INTRAMUSCULAR; INTRAVENOUS at 14:57

## 2019-04-01 RX ADMIN — PROPOFOL 20 MG: 10 INJECTION, EMULSION INTRAVENOUS at 15:09

## 2019-04-01 RX ADMIN — FENTANYL CITRATE 25 MCG: 50 INJECTION INTRAMUSCULAR; INTRAVENOUS at 15:08

## 2019-04-01 RX ADMIN — Medication 10 ML: at 22:38

## 2019-04-01 RX ADMIN — PROPOFOL 20 MG: 10 INJECTION, EMULSION INTRAVENOUS at 14:53

## 2019-04-01 RX ADMIN — PROPOFOL 50 MG: 10 INJECTION, EMULSION INTRAVENOUS at 14:47

## 2019-04-01 RX ADMIN — FENTANYL CITRATE 12.5 MCG: 50 INJECTION INTRAMUSCULAR; INTRAVENOUS at 15:03

## 2019-04-01 RX ADMIN — SODIUM CHLORIDE, SODIUM LACTATE, POTASSIUM CHLORIDE, AND CALCIUM CHLORIDE: 600; 310; 30; 20 INJECTION, SOLUTION INTRAVENOUS at 15:29

## 2019-04-01 RX ADMIN — FENTANYL CITRATE 12.5 MCG: 50 INJECTION INTRAMUSCULAR; INTRAVENOUS at 15:01

## 2019-04-01 RX ADMIN — CEFAZOLIN SODIUM 2 G: 2 SOLUTION INTRAVENOUS at 14:53

## 2019-04-01 RX ADMIN — CEFAZOLIN SODIUM 2 G: 2 SOLUTION INTRAVENOUS at 22:37

## 2019-04-01 RX ADMIN — PROPOFOL 20 MG: 10 INJECTION, EMULSION INTRAVENOUS at 14:58

## 2019-04-01 RX ADMIN — MIDAZOLAM HYDROCHLORIDE 2 MG: 2 INJECTION, SOLUTION INTRAMUSCULAR; INTRAVENOUS at 14:56

## 2019-04-01 ASSESSMENT — PULMONARY FUNCTION TESTS
PIF_VALUE: 0
PIF_VALUE: 1
PIF_VALUE: 0
PIF_VALUE: 1
PIF_VALUE: 0
PIF_VALUE: 1
PIF_VALUE: 0
PIF_VALUE: 1
PIF_VALUE: 0
PIF_VALUE: 1

## 2019-04-01 ASSESSMENT — PAIN SCALES - GENERAL
PAINLEVEL_OUTOF10: 0

## 2019-04-01 NOTE — PROGRESS NOTES
Patient received from the OR to PACU #8  PORT REMOVAL RIGHT CHEST of Dr. Victoria Retana. Placed on PACU monitoring equipment. Report given per CRNA. Per report, patient was stable during the procedure. On arrival, patient is wakeful, MAGAÑA and denies pain.

## 2019-04-01 NOTE — PROGRESS NOTES
PACU Transfer Note    Vitals:    04/01/19 1615   BP: 124/79   Pulse: 85   Resp: 29   Temp: 98.1 °F (36.7 °C)   SpO2: 93%       In: 952 [P.O.:25; I.V.:927]  Out: 10     Pain assessment:  none  Pain Level: 0    Report given to Receiving unit RN.  updated in the [facility]. Sent on to patient's room.  Report called to [facility] on 4S.    4/1/2019 4:34 PM

## 2019-04-01 NOTE — PLAN OF CARE
Problem: Infection:  Goal: Will remain free from infection  Description  Will remain free from infection  4/1/2019 1138 by Levi Holguin RN  Outcome: Ongoing  3/31/2019 2350 by Akira Barahona RN  Outcome: Ongoing  Pt is showing no signs of infection at this time. Pt is afebrile, WBC is 8.0 this morning, pt shows no signs of erythema, swelling, and pain. Pt BC are negative but urine Cx positive for Staph aureus and is being treated with IV ABX   Problem: Safety:  Goal: Free from accidental physical injury  Description  Free from accidental physical injury  4/1/2019 1138 by Levi Holguin RN  Outcome: Ongoing  3/31/2019 2350 by Akira Barahona RN  Outcome: Ongoing  Pt is not a fall risk at this time and will remain free of accidental injury this shift    Problem: Pain:  Goal: Patient's pain/discomfort is manageable  Description  Patient's pain/discomfort is manageable  4/1/2019 1138 by Levi Holguin RN  Outcome: Ongoing  3/31/2019 2350 by Akira Barahona RN  Outcome: Met This Shift  Pt denies any pain this shift. Pt will remain free from and/or pain will be controlled this shift. Will continue to monitor pt for pain.

## 2019-04-01 NOTE — CONSULTS
Department of General Surgery - Adult  Surgical Service [facility] [place] Team  Consult Note        History Obtained From:  patient, electronic medical record    HISTORY OF PRESENT ILLNESS:                The patient is a 43 y.o. female with a PMH of sickle cell and breast cancer who was admitted to the hospital with the c/o in creased fatigue and fever. The patient is well established with [facility] where blood cultures were obtained and sent to [facility].  They came back positive for staph aureus. General surgery has been consulted regarding port a cath removal.  The patient had the port placed by Dr. Jerod Méndez to begin her treatment of chemotherapy for her breast cancer. She is presently on cycle 3 started on 3/22. Patient denies any warmth/redness/pain with port. Past Medical History:        Diagnosis Date    Breast cancer ([facility] [address] 2019    Left breast    Sickle cell trait ([facility] [address]      Past Surgical History:        Procedure Laterality Date     SECTION      X 1    INSERTION / REMOVAL / REPLACEMENT VENOUS ACCESS CATHETER N/A 2019    PORT-A-CATHETER PLACEMENT FOR CHEMOTHERAPY ACCISS/CHEMO TO START 19 WITH C-ARM performed by Page Dillard MD at  [facility] [address]/ SUBCUTANEOUS PORT Right 2019     Current Medications:   Current Facility-Administered Medications: enoxaparin (LOVENOX) injection 40 mg, 40 mg, Subcutaneous, Daily  ceFAZolin (ANCEF) 2 g in dextrose 5 % 50 mL IVPB, 2 g, Intravenous, Q8H  sodium chloride flush 0.9 % injection 10 mL, 10 mL, Intravenous, 2 times per day  sodium chloride flush 0.9 % injection 10 mL, 10 mL, Intravenous, PRN  acetaminophen (TYLENOL) tablet 650 mg, 650 mg, Oral, Q4H PRN  calcium carbonate (TUMS) chewable tablet 500 mg, 500 mg, Oral, TID PRN  ibuprofen (ADVIL;MOTRIN) tablet 400 mg, 400 mg, Oral, Q8H PRN  Allergies:  Patient has no known allergies.     Family History:       Problem Relation Age of Onset    High Blood Pressure Mother     Diabetes Mother     High Blood Pressure Father     Anemia Sister         Sickle Cell    Cancer Maternal Aunt         Throat    Colon Cancer Maternal Grandmother        REVIEW OF SYSTEMS:    CONSTITUTIONAL:  negative for  fevers, chills, sweats and fatigue    PHYSICAL EXAM:    VITALS:  /79   Pulse 91   Temp 99.4 °F (37.4 °C) (Oral)   Resp 16   Ht 5' 6\" (1.676 m)   Wt 210 lb 15.7 oz (95.7 kg)   LMP 03/15/2019 (Within Days)   SpO2 99%   BMI 34.05 kg/m²      CONSTITUTIONAL:  awake, alert, cooperative, no apparent distress, and appears stated age  NECK:  Supple, symmetrical, trachea midline, no adenopathy, thyroid symmetric, not enlarged and no tenderness, skin normal  CHEST/BREASTS:  Right chest port accessed with needle, without any erythema, no tenderness to palpation. No purulent discharge noted.     DATA:    CBC with Differential:    Lab Results   Component Value Date    WBC 8.0 04/01/2019    RBC 3.97 04/01/2019    HGB 9.0 04/01/2019    HCT 27.7 04/01/2019     04/01/2019    MCV 70.0 04/01/2019    MCH 22.7 04/01/2019    MCHC 32.4 04/01/2019    RDW 24.2 04/01/2019    BANDSPCT 1 04/01/2019    LYMPHOPCT 23.0 04/01/2019    MONOPCT 7.0 04/01/2019    BASOPCT 0.0 04/01/2019    MONOSABS 0.6 04/01/2019    LYMPHSABS 1.8 04/01/2019    EOSABS 0.0 04/01/2019    BASOSABS 0.0 04/01/2019     BMP:    Lab Results   Component Value Date     04/01/2019    K 3.7 04/01/2019     04/01/2019    CO2 24 04/01/2019    BUN 5 04/01/2019    LABALBU 2.7 03/30/2019    CREATININE <0.5 04/01/2019    CALCIUM 9.0 04/01/2019    GFRAA >60 04/01/2019    LABGLOM >60 04/01/2019    GLUCOSE 107 04/01/2019     Hepatic Function Panel:    Lab Results   Component Value Date    ALKPHOS 55 03/30/2019    ALT 75 03/30/2019    AST 84 03/30/2019    PROT 6.7 03/30/2019    BILITOT <0.2 03/30/2019    BILIDIR <0.2 03/30/2019    IBILI see below 03/30/2019    LABALBU 2.7 03/30/2019     Calcium:    Lab Results Component Value Date    CALCIUM 9.0 04/01/2019     Magnesium:    Lab Results   Component Value Date    MG 1.50 03/31/2019     Phosphorus:  No results found for: PHOS    IMPRESSION/RECOMMENDATIONS:      44 y/o female with PMH breast cancer who presents with positive blood cultures and echo findings of vegetations concerning for endocarditis. General surgery has been consulted regarding port a cath removal.     - Will plan for removal this afternoon.   - Please keep patient NPO. - on ancef   - Consent obtained. - Awaiting pregnancy test for OR.   - Discussed with staff.      Rosalino Rocha MD   11:07 AM 4/1/2019   [phone]

## 2019-04-01 NOTE — PLAN OF CARE
Problem: Pain:  Goal: Patient's pain/discomfort is manageable  Description  Patient's pain/discomfort is manageable  Outcome: Met This Shift   Denies pain. Problem: Skin Integrity:  Goal: Skin integrity will stabilize  Description  Skin integrity will stabilize  Outcome: Met This Shift   Skin intact. Patient repositions self. Continue to monitor skin integrity. Problem: Infection:  Goal: Will remain free from infection  Description  Will remain free from infection  Outcome: Ongoing  Temp 99.6. Receiving IV antibiotics. Continue to monitor s/s of infection, report changes. Problem: Safety:  Goal: Free from accidental physical injury  Description  Free from accidental physical injury  Outcome: Ongoing   Patient does not meet fall criteria. Gait steady. Calls for assistance appropriately. Call light in reach.

## 2019-04-01 NOTE — ANESTHESIA POSTPROCEDURE EVALUATION
Department of Anesthesiology  Postprocedure Note    Patient: Kemar Rene  MRN: [id number]  YOB: 1980  Date of evaluation: 4/1/2019  Time:  6:25 PM     Procedure Summary     Date:  04/01/19 Room / Location:  [facility] OR 03 / [facility] OR    Anesthesia Start:  9617 Anesthesia Stop:  1529    Procedure:  PORT REMOVAL (Right Chest) Diagnosis:  (INFECTED PORT)    Surgeon:  Adelfo Leon MD Responsible Provider:  Pablo Nix MD    Anesthesia Type:  MAC ASA Status:  2          Anesthesia Type: MAC    Ernie Phase I: Ernie Score: 10    Ernie Phase II:      Last vitals: Reviewed and per EMR flowsheets. Anesthesia Post Evaluation    Patient location during evaluation: PACU  Patient participation: complete - patient participated  Level of consciousness: awake and alert  Pain scale: please refer to nursing notes. Airway patency: patent  Nausea & Vomiting: no nausea and no vomiting  Complications: no  Cardiovascular status: hemodynamically stable  Respiratory status: spontaneous ventilation  Hydration status: stable  Comments: No phone calls received from PACU RN regarding patient.

## 2019-04-01 NOTE — ANESTHESIA PRE PROCEDURE
Procedure Laterality Date     SECTION      X 1    INSERTION / REMOVAL / REPLACEMENT VENOUS ACCESS CATHETER N/A 2019    PORT-A-CATHETER PLACEMENT FOR CHEMOTHERAPY ACCISS/CHEMO TO START 19 WITH C-ARM performed by Estuardo Briggs MD at [address] [facility] W/ SUBCUTANEOUS PORT Right 2019       Social History:    Social History     Tobacco Use    Smoking status: Never Smoker    Smokeless tobacco: Never Used   Substance Use Topics    Alcohol use: No                                Counseling given: Not Answered      Vital Signs (Current):   Vitals:    19 0415 19 0421 19 0858 19 1359   BP: 111/77  109/79 110/82   Pulse: 104  91 89   Resp: 16  16 18   Temp: 99 °F (37.2 °C)  99.4 °F (37.4 °C) 98 °F (36.7 °C)   TempSrc: Oral  Oral Oral   SpO2: 100%  99% 98%   Weight:  210 lb 15.7 oz (95.7 kg)     Height:                                                  BP Readings from Last 3 Encounters:   19 110/82   19 104/71   19 (!) 95/56       NPO Status:                                                                                 BMI:   Wt Readings from Last 3 Encounters:   19 210 lb 15.7 oz (95.7 kg)   19 230 lb 0.8 oz (104.3 kg)   19 221 lb (100.2 kg)     Body mass index is 34.05 kg/m².     CBC:   Lab Results   Component Value Date    WBC 8.0 2019    RBC 3.97 2019    HGB 9.0 2019    HCT 27.7 2019    MCV 70.0 2019    RDW 24.2 2019     2019       CMP:   Lab Results   Component Value Date     2019    K 3.7 2019     2019    CO2 24 2019    BUN 5 2019    CREATININE <0.5 2019    GFRAA >60 2019    LABGLOM >60 2019    GLUCOSE 107 2019    PROT 6.7 2019    CALCIUM 9.0 2019    BILITOT <0.2 2019    ALKPHOS 55 2019    AST 84 2019    ALT 75 2019       POC Tests: No results for input(s): POCGLU, POCNA, POCK, POCCL, POCBUN, POCHEMO, POCHCT in the last 72 hours. Coags: No results found for: PROTIME, INR, APTT    HCG (If Applicable):   Lab Results   Component Value Date    PREGTESTUR Negative 03/30/2019        ABGs: No results found for: PHART, PO2ART, BDE6EFN, HLT6RQO, BEART, [id number]     Type & Screen (If Applicable):  No results found for: LABABO, [address]    Anesthesia Evaluation  Patient summary reviewed and Nursing notes reviewed  Airway: Mallampati: II  TM distance: >3 FB   Neck ROM: full  Mouth opening: > = 3 FB Dental: normal exam         Pulmonary:Negative Pulmonary ROS and normal exam  breath sounds clear to auscultation                             Cardiovascular:Negative CV ROS    (+) valvular problems/murmurs:,         Rhythm: regular  Rate: normal  Echocardiogram reviewed               ROS comment: Asx tricuspid valve vegetation (by ECHO report)  Endocarditis per chart     Neuro/Psych:   Negative Neuro/Psych ROS              GI/Hepatic/Renal: Neg GI/Hepatic/Renal ROS            Endo/Other: Negative Endo/Other ROS                    Abdominal:           Vascular: negative vascular ROS. Anesthesia Plan      MAC and general     ASA 2       Induction: intravenous and inhalational.    MIPS: Postoperative opioids intended and Prophylactic antiemetics administered. Anesthetic plan and risks discussed with patient and spouse.         Attending anesthesiologist reviewed and agrees with Lu Gonzalez MD   4/1/2019

## 2019-04-01 NOTE — PROGRESS NOTES
Internal Medicine PGY-1 Resident Progress Note        PCP: Kesha Hazel MD, MD    Date of Admission: 3/29/2019    Chief Complaint: sent for positive blood cultures    Subjective: Patient looks improved this morning. She is feeling well. She has no chest pain, shortness of breath or abdominal pain. Discussed plan with Dr. Charmaine Barraza. Will ask general surgery to remove her port-a-cath as it is the most likely source of the Staph bacteremia. She was started on ancef by ID. She will get MARGOT with cardiology to r/o vegetations. Discussed with patient and her , all questions answered. Will give 1 dose of venofer this morning, as infection has cleared. Medications:  Reviewed    Infusion Medications   Scheduled Medications    iron sucrose  300 mg Intravenous Once    enoxaparin  40 mg Subcutaneous Daily    ceFAZolin  2 g Intravenous Q8H    sodium chloride flush  10 mL Intravenous 2 times per day     PRN Meds: sodium chloride flush, acetaminophen, calcium carbonate, ibuprofen      Intake/Output Summary (Last 24 hours) at 4/1/2019 0933  Last data filed at 4/1/2019 0905  Gross per 24 hour   Intake 997 ml   Output 1460 ml   Net -463 ml       Physical Exam Performed:    /79   Pulse 91   Temp 99.4 °F (37.4 °C) (Oral)   Resp 16   Ht 5' 6\" (1.676 m)   Wt 210 lb 15.7 oz (95.7 kg)   LMP 03/15/2019 (Within Days)   SpO2 99%   BMI 34.05 kg/m²     General appearance: No apparent distress, appears stated age and cooperative. HEENT: Pupils equal, round, and reactive to light. Conjunctivae/corneas clear. Chest: R port-a-cath in place, accessed with no erythema or purulence  Neck: Supple, with full range of motion. No jugular venous distention. Trachea midline. Respiratory:  Normal respiratory effort. Clear to auscultation, bilaterally without Rales/Wheezes/Rhonchi. Cardiovascular: Regular rate with regular rhythm, with normal S1/S2 without murmurs, rubs or gallops.   Abdomen: Soft, non-tender, non-distended with normal bowel sounds. Musculoskeletal: No clubbing, cyanosis or edema bilaterally. Full range of motion without deformity. Skin: Skin color, texture, turgor normal.  No rashes or lesions. Neurologic:  Neurovascularly intact without any focal sensory/motor deficits. Cranial nerves: II-XII intact, grossly non-focal.  Psychiatric: Alert and oriented, thought content appropriate, normal insight  Capillary Refill: Brisk,< 3 seconds   Peripheral Pulses: +2 palpable, equal bilaterally       Labs:   Recent Labs     03/31/19  0430 03/31/19  1140 03/31/19  1700 04/01/19  0433   WBC 6.5  --  7.0 8.0   HGB 8.6*  --  8.5* 9.0*   HCT 26.5*  --  26.9* 27.7*   PLT see comment* 135 121* 124*     Recent Labs     03/30/19  0330 03/31/19  0430 04/01/19  0433    141 141   K 3.4* 3.4* 3.7    104 103   CO2 23 23 24   BUN 9 7 5*   CREATININE <0.5* 0.6 <0.5*   CALCIUM 8.1* 8.6 9.0     Recent Labs     03/30/19  0330   AST 84*   ALT 75*   BILIDIR <0.2   BILITOT <0.2   ALKPHOS 55     No results for input(s): INR in the last 72 hours. No results for input(s): Osorio Barn in the last 72 hours. Urinalysis:      Lab Results   Component Value Date    NITRU Negative 03/30/2019    WBCUA 20-50 03/30/2019    BACTERIA 3+ 03/30/2019    RBCUA 3-5 03/30/2019    BLOODU TRACE-INTACT 03/30/2019    SPECGRAV 1.015 03/30/2019    GLUCOSEU Negative 03/30/2019       Radiology:  XR CHEST PORTABLE   Final Result      Slight progression of nonspecific patchy airspace disease throughout both lungs      XR CHEST STANDARD (2 VW)   Final Result     Question diffuse airspace opacities which may represent inflammatory    infectious process versus pulmonary vascular congestion. Assessment/Plan:  Ms. Yamilex Gabriel is a 44 yo female with invasive ductal carcinoma of L breast, sickle cell trait to who presented from [facility] with blood cx positive for Staph aureus.     Sepsis 2/2 Staph Aureus Bacteremia (improved)- blood cultures from [facility] show pan-sensitive Staph aureus  -WBC improving - pt was leukopenic on arrival  - Vancomycin and cefepime discontinued 3/31  -started on ancef by ID - appreciate assistance   -2g q 8 hr  -repeat blood cultures negative  -urine culture shows Staph aureus   -likely seeding from bacteremia   -RPP negative  - MARGOT and TTE pending   -no murmur heard, low suspicion for endocarditis  - may need to consider port removal if cultures remain persistently positive  - telemetry  -CXR shows progressive patchy airspace disease, unlikely pneumonia    -pt saturating well on room air, afebrile, no cough, CTAB  -general surgery consulted to remove port-a-cath  -will place PICC once port removed     Invasive Ductal Carcinoma of Left Breast - T2N1M0, ER+,[place]+, Her-2/mk +. Currently on Cycle 3 of Docetaxel +Carboplatin + Trastuzumab + Pertuzumab  - oncology is primary   -next chemo cycle is on 4/12     Pancytopenia (improved) - 2/2 chemotherapy + sepsis.    -WBC 8.0 (2.2)  -plts stable  -Hgb stable after 1U pRBCs @ [facility] 3/30   -will give venofer  - daily CBC, will continue to monitor  - transfuse if Hgb <7      Code Status:  FULL   F/E/N:  General   DVT Prophylaxis:  SCDs  Disposition:  GMF    I will discuss the patient with the senior resident and Dr. Lola Degroot MD  Internal Medicine Resident PGY-1  Reached via [employer]

## 2019-04-01 NOTE — PROGRESS NOTES
ID Follow-up NOTE    CC:   S aureus bacteremia  Antibiotics: Ancef    Admit Date: 3/29/2019  Hospital Day: 4    Subjective:     Patient reports L upper chest is sore from site of port removal.        Objective:     Patient Vitals for the past 8 hrs:   BP Temp Temp src Pulse Resp SpO2   04/01/19 1359 110/82 98 °F (36.7 °C) Oral 89 18 98 %   04/01/19 0858 109/79 99.4 °F (37.4 °C) Oral 91 16 99 %     I/O last 3 completed shifts: In: 1007 [P.O.:460; I.V.:547]  Out: 1100 [Urine:1100]  No intake/output data recorded. EXAM:  GENERAL: No apparent distress. HEENT: Membranes moist, no oral lesion  NECK:  Supple  LUNGS: Clear b/l, no rales, no dullness; R upper chest with dressing over surg site.   CARDIAC: RRR, no murmur appreciated  ABD:  + BS, soft / NT  EXT:  No rash, no edema, no lesions  NEURO: No focal neurologic findings  PSYCH: Orientation, sensorium, mood normal  LINES:  Peripheral iv       Data Review:  Lab Results   Component Value Date    WBC 8.0 04/01/2019    HGB 9.0 (L) 04/01/2019    HCT 27.7 (L) 04/01/2019    MCV 70.0 (L) 04/01/2019     (L) 04/01/2019     Lab Results   Component Value Date    CREATININE <0.5 (L) 04/01/2019    BUN 5 (L) 04/01/2019     04/01/2019    K 3.7 04/01/2019     04/01/2019    CO2 24 04/01/2019       Hepatic Function Panel:   Lab Results   Component Value Date    ALKPHOS 55 03/30/2019    ALT 75 03/30/2019    AST 84 03/30/2019    PROT 6.7 03/30/2019    BILITOT <0.2 03/30/2019    BILIDIR <0.2 03/30/2019    IBILI see below 03/30/2019    LABALBU 2.7 03/30/2019       Micro:  3/30 Pneumococcal / Legionella urinary antigen negative  3/29 BC x 2 no growth to date  3/29 Urine cult >100k Staphylococcal sp     3/29 BC x [facility], accession -[id number] and [id number] - [facility] (see reports)     Imaging:   3/29 CXR:  XR CHEST STANDARD (2 VW)   Final Result     Question diffuse airspace opacities which may represent inflammatory    infectious process versus pulmonary vascular congestion. Echo:  3/29 MARGOT   Summary   Normal left ventricle size, wall thickness, and systolic function was normal.   Mild mitral and tricuspid regurgitation is present.   Small mobile vegetation on posterior leaflet of tricuspid valve suggestive   of endocarditis. Scheduled Meds:   ceFAZolin  2 g Intravenous Q8H    enoxaparin  40 mg Subcutaneous Daily    sodium chloride flush  10 mL Intravenous 2 times per day       Continuous Infusions:   sodium chloride         PRN Meds:  sodium chloride, Local Anesthetic Custom Mixture, sodium chloride flush, acetaminophen, calcium carbonate, ibuprofen      Assessment:     42 yo woman with hx breast ca (invasive ductal carcinoma, T2N1M0, ER/[place] +, Her-2/mk +), sickle cell trait.     Pt had L port, placed 2/8/19. Pt was seen at [facility] on 3/29, c/o fatigue. Had infusion PRBC, iron. Pt developed temp 103, BC drawn. No resp complaints. No  complaints. BC + GPC. Referred to [facility] for further evaluation / therapy.     Admit 3/29 - No problem with port (accessed). No resp complaints. No  complaints. Afebrile, WBC 2.5 with 56%PMN. UA mod LE, micro 20-50 WBC. BC sent. Started on vancomycin + cefepime.     MARGOT 3/29 - felt to have TV vegetation. OR today 4/1 - port removal    IMP/  Hx breast ca (invasive ductal carcinoma, T2N1M0 (ER/[place] +, Her-2/Mk +)  Treatment - Docetaxel +Carboplatin + Trastuzumab + Pertuzumab. Cycle 3 started 3/22     S aureus bacteremia, 2 sets, presumed source port.     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

## 2019-04-02 LAB
ALBUMIN SERPL-MCNC: 3 G/DL (ref 3.4–5)
ANAEROBIC CULTURE: NORMAL
ANION GAP SERPL CALCULATED.3IONS-SCNC: 14 MMOL/L (ref 3–16)
ANION GAP SERPL CALCULATED.3IONS-SCNC: 14 MMOL/L (ref 3–16)
BASOPHILS ABSOLUTE: 0 K/UL (ref 0–0.2)
BASOPHILS RELATIVE PERCENT: 0 %
BLOOD BANK DISPENSE STATUS: NORMAL
BLOOD BANK PRODUCT CODE: NORMAL
BPU ID: NORMAL
BUN BLDV-MCNC: 5 MG/DL (ref 7–20)
BUN BLDV-MCNC: 5 MG/DL (ref 7–20)
CALCIUM SERPL-MCNC: 8.8 MG/DL (ref 8.3–10.6)
CALCIUM SERPL-MCNC: 9.1 MG/DL (ref 8.3–10.6)
CHLORIDE BLD-SCNC: 102 MMOL/L (ref 99–110)
CHLORIDE BLD-SCNC: 104 MMOL/L (ref 99–110)
CO2: 24 MMOL/L (ref 21–32)
CO2: 25 MMOL/L (ref 21–32)
CREAT SERPL-MCNC: <0.5 MG/DL (ref 0.6–1.1)
CREAT SERPL-MCNC: <0.5 MG/DL (ref 0.6–1.1)
CULTURE SURGICAL: NORMAL
DESCRIPTION BLOOD BANK: NORMAL
EOSINOPHILS ABSOLUTE: 0 K/UL (ref 0–0.6)
EOSINOPHILS RELATIVE PERCENT: 0 %
GFR AFRICAN AMERICAN: >60
GFR AFRICAN AMERICAN: >60
GFR NON-AFRICAN AMERICAN: >60
GFR NON-AFRICAN AMERICAN: >60
GLUCOSE BLD-MCNC: 105 MG/DL (ref 70–99)
GLUCOSE BLD-MCNC: 94 MG/DL (ref 70–99)
HCT VFR BLD CALC: 27.2 % (ref 36–48)
HEMOGLOBIN: 8.8 G/DL (ref 12–16)
LYMPHOCYTES ABSOLUTE: 1.6 K/UL (ref 1–5.1)
LYMPHOCYTES RELATIVE PERCENT: 22 %
MAGNESIUM: 1.1 MG/DL (ref 1.8–2.4)
MAGNESIUM: 1.8 MG/DL (ref 1.8–2.4)
MCH RBC QN AUTO: 22.4 PG (ref 26–34)
MCHC RBC AUTO-ENTMCNC: 32.2 G/DL (ref 31–36)
MCV RBC AUTO: 69.7 FL (ref 80–100)
MONOCYTES ABSOLUTE: 0.6 K/UL (ref 0–1.3)
MONOCYTES RELATIVE PERCENT: 8 %
NEUTROPHILS ABSOLUTE: 5.1 K/UL (ref 1.7–7.7)
NEUTROPHILS RELATIVE PERCENT: 70 %
OVALOCYTES: ABNORMAL
PDW BLD-RTO: 24.1 % (ref 12.4–15.4)
PHOSPHORUS: 2.9 MG/DL (ref 2.5–4.9)
PLATELET # BLD: 132 K/UL (ref 135–450)
PMV BLD AUTO: 8.7 FL (ref 5–10.5)
POTASSIUM REFLEX MAGNESIUM: 3.1 MMOL/L (ref 3.5–5.1)
POTASSIUM SERPL-SCNC: 3.6 MMOL/L (ref 3.5–5.1)
RBC # BLD: 3.91 M/UL (ref 4–5.2)
SCHISTOCYTES: ABNORMAL
SODIUM BLD-SCNC: 140 MMOL/L (ref 136–145)
SODIUM BLD-SCNC: 143 MMOL/L (ref 136–145)
TEAR DROP CELLS: ABNORMAL
WBC # BLD: 7.3 K/UL (ref 4–11)

## 2019-04-02 PROCEDURE — 36415 COLL VENOUS BLD VENIPUNCTURE: CPT

## 2019-04-02 PROCEDURE — 1200000000 HC SEMI PRIVATE

## 2019-04-02 PROCEDURE — 6370000000 HC RX 637 (ALT 250 FOR IP): Performed by: STUDENT IN AN ORGANIZED HEALTH CARE EDUCATION/TRAINING PROGRAM

## 2019-04-02 PROCEDURE — 2580000003 HC RX 258: Performed by: STUDENT IN AN ORGANIZED HEALTH CARE EDUCATION/TRAINING PROGRAM

## 2019-04-02 PROCEDURE — 83735 ASSAY OF MAGNESIUM: CPT

## 2019-04-02 PROCEDURE — 6360000002 HC RX W HCPCS: Performed by: STUDENT IN AN ORGANIZED HEALTH CARE EDUCATION/TRAINING PROGRAM

## 2019-04-02 PROCEDURE — 94664 DEMO&/EVAL PT USE INHALER: CPT

## 2019-04-02 PROCEDURE — 94760 N-INVAS EAR/PLS OXIMETRY 1: CPT

## 2019-04-02 PROCEDURE — 99232 SBSQ HOSP IP/OBS MODERATE 35: CPT | Performed by: INTERNAL MEDICINE

## 2019-04-02 PROCEDURE — 6360000002 HC RX W HCPCS: Performed by: INTERNAL MEDICINE

## 2019-04-02 PROCEDURE — 93312 ECHO TRANSESOPHAGEAL: CPT | Performed by: INTERNAL MEDICINE

## 2019-04-02 PROCEDURE — 85025 COMPLETE CBC W/AUTO DIFF WBC: CPT

## 2019-04-02 PROCEDURE — 80069 RENAL FUNCTION PANEL: CPT

## 2019-04-02 RX ORDER — POTASSIUM CHLORIDE 1.5 G/1.77G
20 POWDER, FOR SOLUTION ORAL ONCE
Status: COMPLETED | OUTPATIENT
Start: 2019-04-02 | End: 2019-04-02

## 2019-04-02 RX ORDER — MAGNESIUM SULFATE IN WATER 40 MG/ML
4 INJECTION, SOLUTION INTRAVENOUS ONCE
Status: COMPLETED | OUTPATIENT
Start: 2019-04-02 | End: 2019-04-02

## 2019-04-02 RX ORDER — LIDOCAINE HYDROCHLORIDE 10 MG/ML
5 INJECTION, SOLUTION EPIDURAL; INFILTRATION; INTRACAUDAL; PERINEURAL ONCE
Status: DISCONTINUED | OUTPATIENT
Start: 2019-04-02 | End: 2019-04-03 | Stop reason: HOSPADM

## 2019-04-02 RX ORDER — SODIUM CHLORIDE 0.9 % (FLUSH) 0.9 %
10 SYRINGE (ML) INJECTION EVERY 12 HOURS SCHEDULED
Status: DISCONTINUED | OUTPATIENT
Start: 2019-04-02 | End: 2019-04-03 | Stop reason: HOSPADM

## 2019-04-02 RX ORDER — POTASSIUM CHLORIDE 1.5 G/1.77G
40 POWDER, FOR SOLUTION ORAL ONCE
Status: COMPLETED | OUTPATIENT
Start: 2019-04-02 | End: 2019-04-02

## 2019-04-02 RX ORDER — SODIUM CHLORIDE 0.9 % (FLUSH) 0.9 %
10 SYRINGE (ML) INJECTION PRN
Status: DISCONTINUED | OUTPATIENT
Start: 2019-04-02 | End: 2019-04-03 | Stop reason: HOSPADM

## 2019-04-02 RX ORDER — MAGNESIUM SULFATE 1 G/100ML
1 INJECTION INTRAVENOUS ONCE
Status: COMPLETED | OUTPATIENT
Start: 2019-04-02 | End: 2019-04-02

## 2019-04-02 RX ADMIN — POTASSIUM CHLORIDE 40 MEQ: 1.5 POWDER, FOR SOLUTION ORAL at 06:39

## 2019-04-02 RX ADMIN — MAGNESIUM SULFATE HEPTAHYDRATE 1 G: 1 INJECTION, SOLUTION INTRAVENOUS at 17:08

## 2019-04-02 RX ADMIN — POTASSIUM CHLORIDE 20 MEQ: 1.5 POWDER, FOR SOLUTION ORAL at 17:07

## 2019-04-02 RX ADMIN — CEFAZOLIN SODIUM 2 G: 2 SOLUTION INTRAVENOUS at 06:39

## 2019-04-02 RX ADMIN — MAGNESIUM SULFATE HEPTAHYDRATE 4 G: 40 INJECTION, SOLUTION INTRAVENOUS at 08:43

## 2019-04-02 RX ADMIN — ENOXAPARIN SODIUM 40 MG: 40 INJECTION SUBCUTANEOUS at 17:07

## 2019-04-02 RX ADMIN — Medication 10 ML: at 21:29

## 2019-04-02 RX ADMIN — CEFAZOLIN SODIUM 2 G: 2 SOLUTION INTRAVENOUS at 23:36

## 2019-04-02 RX ADMIN — IBUPROFEN 400 MG: 400 TABLET, FILM COATED ORAL at 04:46

## 2019-04-02 RX ADMIN — Medication 10 ML: at 07:32

## 2019-04-02 RX ADMIN — CEFAZOLIN SODIUM 2 G: 2 SOLUTION INTRAVENOUS at 14:46

## 2019-04-02 RX ADMIN — POTASSIUM CHLORIDE 20 MEQ: 1.5 POWDER, FOR SOLUTION ORAL at 11:58

## 2019-04-02 RX ADMIN — IBUPROFEN 400 MG: 400 TABLET, FILM COATED ORAL at 14:48

## 2019-04-02 ASSESSMENT — PAIN DESCRIPTION - LOCATION: LOCATION: CHEST

## 2019-04-02 ASSESSMENT — PAIN DESCRIPTION - ONSET: ONSET: GRADUAL

## 2019-04-02 ASSESSMENT — PAIN SCALES - GENERAL
PAINLEVEL_OUTOF10: 0
PAINLEVEL_OUTOF10: 4
PAINLEVEL_OUTOF10: 0
PAINLEVEL_OUTOF10: 5

## 2019-04-02 ASSESSMENT — PAIN DESCRIPTION - PROGRESSION: CLINICAL_PROGRESSION: NOT CHANGED

## 2019-04-02 ASSESSMENT — PAIN DESCRIPTION - FREQUENCY: FREQUENCY: INTERMITTENT

## 2019-04-02 ASSESSMENT — PAIN DESCRIPTION - PAIN TYPE: TYPE: SURGICAL PAIN

## 2019-04-02 ASSESSMENT — PAIN DESCRIPTION - ORIENTATION: ORIENTATION: RIGHT;UPPER

## 2019-04-02 ASSESSMENT — PAIN DESCRIPTION - DESCRIPTORS: DESCRIPTORS: SORE

## 2019-04-02 ASSESSMENT — PAIN - FUNCTIONAL ASSESSMENT: PAIN_FUNCTIONAL_ASSESSMENT: ACTIVITIES ARE NOT PREVENTED

## 2019-04-02 NOTE — DISCHARGE INSTR - COC
Continuity of Care Form    Patient Name: Cristiane Way   :  1980  MRN:  [id number]    Admit date:  3/29/2019  Discharge date:  ***      CASE MANAGEMENT/SOCIAL WORK SECTION    Inpatient Status Date: 3/30/2019    Readmission Risk Assessment Score:  Readmission Risk              Risk of Unplanned Readmission:        8           Discharging to  Agency   · Name: Nicolas[facility] Rivas   · Phone: [phone] & [phone]  · Fax: [phone] & [phone]      / signature: Electronically signed by TYSON Padilla, LSW on 2019 at 1:08 PM      PHYSICIAN SECTION    Prognosis: Fair    Condition at Discharge: Stable    Rehab Potential (if transferring to Rehab): Good    Recommended Labs or Other Treatments After Discharge:     INFUSION ORDERS:  Ceftriaxone 2 gm iv daily through 19  - Check CBC w diff, CMP, ESR, CRP every Mon or Tue - FAX result to [phone]  - Call with any antibiotic / infusion issue, [phone]    Physician Certification: I certify the above information and transfer of Cristiane Way  is necessary for the continuing treatment of the diagnosis listed and that she requires [address] for greater 30 days.      Update Admission H&P: No change in H&P    PHYSICIAN SIGNATURE:  Electronically signed by Dorcas Cranker, MD on 4/3/19 at 6:41 AM

## 2019-04-02 NOTE — PROGRESS NOTES
y.o. female POD1 status post port removal secondary to bacteremia. - Surgical site dressing changed on AM rounds. Considering small amount of serosanguinous output and no active signs of infection, dressing was replaced with interrupted steri strips to provide good tissue approximation while allowing for drainage should any residual infection persist.   - Outer dressing changes per RNs  - OK for discharge from surgery perspective. - Please contact with any questions or concerns. Vannessa Hernandez MD, MPH  PGY-1 General Surgery  04/02/19  6:42 AM  [phone]    I am post-call and off campus today.  If you have any questions or concerns regarding this patient's care today, please page: Sarwat Arreola, APRN [phone] or Simran Fournier DO PGY-3 [phone]

## 2019-04-02 NOTE — PROGRESS NOTES
ID Follow-up NOTE    CC:   S aureus bacteremia  Antibiotics: Ancef    Admit Date: 3/29/2019  Hospital Day: 5    Subjective:     Patient reports L upper chest is sore from site of port removal.    No other complaint      Objective:     Patient Vitals for the past 8 hrs:   BP Temp Temp src Pulse Resp SpO2   04/02/19 0846 -- -- -- -- 16 96 %   04/02/19 0731 112/82 98.1 °F (36.7 °C) Oral 90 18 96 %     I/O last 3 completed shifts: In: 7165 [P.O.:340; I.V.:937]  Out: 1210 [Urine:1200; Blood:10]  I/O this shift:  In: 240 [P.O.:240]  Out: -     EXAM:  GENERAL: No apparent distress. HEENT: Membranes moist, no oral lesion  NECK:  Supple  LUNGS: Clear b/l, no rales, no dullness; R upper chest with dressing over surg site.   CARDIAC: RRR, no murmur appreciated  ABD:  + BS, soft / NT  EXT:  No rash, no edema, no lesions  NEURO: No focal neurologic findings  PSYCH: Orientation, sensorium, mood normal  LINES:  Peripheral iv       Data Review:  Lab Results   Component Value Date    WBC 7.3 04/02/2019    HGB 8.8 (L) 04/02/2019    HCT 27.2 (L) 04/02/2019    MCV 69.7 (L) 04/02/2019     (L) 04/02/2019     Lab Results   Component Value Date    CREATININE <0.5 (L) 04/02/2019    BUN 5 (L) 04/02/2019     04/02/2019    K 3.1 (L) 04/02/2019     04/02/2019    CO2 24 04/02/2019       Hepatic Function Panel:   Lab Results   Component Value Date    ALKPHOS 55 03/30/2019    ALT 75 03/30/2019    AST 84 03/30/2019    PROT 6.7 03/30/2019    BILITOT <0.2 03/30/2019    BILIDIR <0.2 03/30/2019    IBILI see below 03/30/2019    LABALBU 2.7 03/30/2019       Micro:  3/30 Pneumococcal / Legionella urinary antigen negative  3/29 BC x 2 no growth to date  3/29 Urine cult >100k Staphylococcal aureus     3/29 [facility] -[id number] and [id number] - [facility] (see reports)     Imaging:   3/29 CXR:  XR CHEST STANDARD (2 VW)   Final Result     Question diffuse airspace opacities which may represent inflammatory    infectious

## 2019-04-02 NOTE — PROCEDURES
Pt noted to have order for PICC, Blood cultures noted to be redone and pending from 4/2, will be able to place PICC 4/3 if negative. MD requested double d/t pt receives Chemo.

## 2019-04-02 NOTE — PLAN OF CARE
Problem: Pain:  Goal: Patient's pain/discomfort is manageable  Description  Patient's pain/discomfort is manageable  Outcome: Ongoing   Pt c/o of minimal discomfort in R upper chest s/p Port removal. Dressing CDI. PRN ibuprofen administered, see MAR. Will reassess pain and treat as needed.

## 2019-04-02 NOTE — PROGRESS NOTES
Internal Medicine PGY-1 Resident Progress Note        PCP: Luís Hernandez MD, MD    Date of Admission: 3/29/2019    Chief Complaint: sent for positive blood cultures    Subjective: Port removed by surgery yesterday with no complication. Surgery replaced dressing with steri strips this morning. Micro called at [facility] lab and said BCx NGTD from 3/29 and 4/1. Will place PICC tomorrow morning for home abx and chemotherapy. She is sore from port removal. Otherwise no chest pain, shortness of breath or abdominal pain. Medications:  Reviewed    Infusion Medications   Scheduled Medications    potassium chloride  20 mEq Oral Once    magnesium sulfate  4 g Intravenous Once    lidocaine 1 % injection  5 mL Intradermal Once    sodium chloride flush  10 mL Intravenous 2 times per day    ceFAZolin  2 g Intravenous Q8H    enoxaparin  40 mg Subcutaneous Daily    sodium chloride flush  10 mL Intravenous 2 times per day     PRN Meds: sodium chloride flush, sodium chloride flush, acetaminophen, calcium carbonate, ibuprofen      Intake/Output Summary (Last 24 hours) at 4/2/2019 0818  Last data filed at 4/2/2019 0450  Gross per 24 hour   Intake 1277 ml   Output 1210 ml   Net 67 ml       Physical Exam Performed:    /82   Pulse 90   Temp 98.1 °F (36.7 °C) (Oral)   Resp 18   Ht 5' 6\" (1.676 m)   Wt 210 lb 12.2 oz (95.6 kg)   LMP 03/15/2019 (Within Days)   SpO2 96%   BMI 34.02 kg/m²     General appearance: No apparent distress, appears stated age and cooperative. HEENT: Pupils equal, round, and reactive to light. Conjunctivae/corneas clear. Chest: R port-a-cath site now with steri-strips- site looks c/d/i  Neck: Supple, with full range of motion. No jugular venous distention. Trachea midline. Respiratory:  Normal respiratory effort. Clear to auscultation, bilaterally without Rales/Wheezes/Rhonchi.   Cardiovascular: Regular rate with regular rhythm, with normal S1/S2 without murmurs, rubs or gallops. Abdomen: Soft, non-tender, non-distended with normal bowel sounds. Musculoskeletal: No clubbing, cyanosis or edema bilaterally. Full range of motion without deformity. Skin: Skin color, texture, turgor normal.  No rashes or lesions. Neurologic:  Neurovascularly intact without any focal sensory/motor deficits. Cranial nerves: II-XII intact, grossly non-focal.  Psychiatric: Alert and oriented, thought content appropriate, normal insight  Capillary Refill: Brisk,< 3 seconds   Peripheral Pulses: +2 palpable, equal bilaterally       Labs:   Recent Labs     03/31/19  1700 04/01/19  0433 04/02/19  0536   WBC 7.0 8.0 7.3   HGB 8.5* 9.0* 8.8*   HCT 26.9* 27.7* 27.2*   * 124* 132*     Recent Labs     03/31/19  0430 04/01/19  0433 04/02/19  0536    141 140   K 3.4* 3.7 3.1*    103 102   CO2 23 24 24   BUN 7 5* 5*   CREATININE 0.6 <0.5* <0.5*   CALCIUM 8.6 9.0 8.8     No results for input(s): AST, ALT, BILIDIR, BILITOT, ALKPHOS in the last 72 hours. Recent Labs     04/01/19  1355   INR 1.43*     No results for input(s): Willia Beverage in the last 72 hours. Urinalysis:      Lab Results   Component Value Date    NITRU Negative 03/30/2019    WBCUA 20-50 03/30/2019    BACTERIA 3+ 03/30/2019    RBCUA 3-5 03/30/2019    BLOODU TRACE-INTACT 03/30/2019    SPECGRAV 1.015 03/30/2019    GLUCOSEU Negative 03/30/2019       Radiology:  XR CHEST PORTABLE   Final Result      Slight progression of nonspecific patchy airspace disease throughout both lungs      XR CHEST STANDARD (2 VW)   Final Result     Question diffuse airspace opacities which may represent inflammatory    infectious process versus pulmonary vascular congestion. Assessment/Plan:  Ms. Gregory Capps is a 44 yo female with invasive ductal carcinoma of L breast, sickle cell trait to who presented from [facility] with blood cx positive for Staph aureus.     Sepsis 2/2 Staph Aureus Bacteremia (improved)- blood cultures from [facility] show pan-sensitive Staph aureus  -WBC improving - pt was leukopenic on arrival  - Vancomycin and cefepime discontinued 3/31  -started on ancef by ID - appreciate assistance   -2g q 8 hr  -repeat blood cultures negative, pending results from cultures drawn on 4/1   -if all cx negative tomorrow, will place PICC  -urine culture shows Staph aureus   -likely seeding from bacteremia   -RPP negative  - MARGOT shows tricuspid vegetation  - telemetry  -CXR shows progressive patchy airspace disease, unlikely pneumonia     Endocarditis   -tricuspid vegetation  -likely from infected port-a-cath  -port removed yesterday  -will need 6 weeks IV abx     Invasive Ductal Carcinoma of Left Breast - T2N1M0, ER+,[place]+, Her-2/mk +. Currently on Cycle 3 of Docetaxel +Carboplatin + Trastuzumab + Pertuzumab  - oncology is primary   -next chemo cycle is on 4/12     Pancytopenia (improved) - 2/2 chemotherapy + sepsis.    -WBC 7.3 (2.2 on admission)  -plts stable  -Hgb stable after 1U pRBCs @ [facility] 3/30  - daily CBC, will continue to monitor  - transfuse if Hgb <7     Hypokalemia/Hypomagnesemia  -replaced  -monitor     Code Status:  FULL   F/E/N:  General   DVT Prophylaxis:  SCDs, lovenox  Disposition:  GMF    I will discuss the patient with the senior resident and Dr. Constantino Alicea MD  Internal Medicine Resident PGY-1  Reached via [address] [place] [address]

## 2019-04-02 NOTE — PROGRESS NOTES
POST-OPERATIVE NOTE    POST-OP DIAGNOSIS: Staph aureus bacteremia, tricuspid valve endocarditis, h/o breast cancer on chemotherapy     PROCEDURE(S): Removal R IJ port-a-cath    SUBJECTIVE:   Patient reports excellent pain control. She is tolerating a carb control diet well, reporting no post-prandial pain, nausea, or vomiting. Patient is ambulating without any issues and voiding without difficulty. OBJECTIVE:  Anesthesia type: MAC    I/O    Intra op    Post op     Fluids  927 mL 120 mL     EBL 10 mL 0 mL     Urine 0 mL 2x     Physical Exam:  Vitals:   Vitals:    04/01/19 1600 04/01/19 1615 04/01/19 1649 04/01/19 2024   BP: 128/80 124/79 116/85 108/78   Pulse: 79 85 79 106   Resp: 19 29 20 18   Temp:  98.1 °F (36.7 °C) 98 °F (36.7 °C) 99 °F (37.2 °C)   TempSrc:  Temporal Oral Oral   SpO2: 97% 93% 96% 97%   Weight:       Height:         General Appearance: alert, resting comfortably in bed in no acute distress  Neuro: A&Ox3, no focal deficits, sensation intact  Chest/Lungs: Normal effort, breathing room air, no adventitious breath sounds; surgical site with dressing that is clean/dry/intact  Cardiovascular: RRR, no murmurs/gallops/rubs  Abdomen: Soft, non-tender, non-distended  : No cruz  Extremities: no edema, no cyanosis    Assessment and Plan: This is a 43y.o. year old female POD0 status post removal of R IJ port-a-cath secondary to Staph aureus bacteremia, tricuspid valve endocarditis.      Analgesia: Tylenol PRN mild pain, ibuprofen PRN   Cardiovascular: Hemodynamically stable with persistent slight tachycardia; continue  q4h vital checks; medical management per primary team  Respiratory: Extubated, encourage hourly incentive spirometry and deep breathing  FEN:  Fluids: Per primary, Diet: No restrictions from a surgical perspective   : Patient is urinating independently and without difficulty  Wound: Local care with wet-to-dry that will be changed qD; will return in the AM for dressing change after which dressing changes will be per RNs  Ambulation: Out of bed to chair during the day, encourage frequent ambulation  VTE PPx: Lovenox and SCDs  Disposition: Med/Surg floor    Champ MD Lyubov, MPH  PGY-1 General Surgery  04/01/19  8:25 PM  [phone]

## 2019-04-02 NOTE — FLOWSHEET NOTE
04/02/19 1558   Encounter Summary   Services provided to: Patient   Referral/Consult From: Rounding   Continue Visiting   (4/2, agustín )   Complexity of Encounter Moderate   Length of Encounter 15 minutes   Routine   Type Initial   Assessment Calm; Approachable; Hopeful   Intervention Active listening;Explored feelings, thoughts, concerns;Nurtured hope   Outcome Comfort;Expressed gratitude

## 2019-04-02 NOTE — PLAN OF CARE
Problem: Infection:  Goal: Will remain free from infection  Description  Will remain free from infection  Outcome: Ongoing  Note:   Patient remains on IV antibiotics for infection. Afebrile. To be discharged with PICC and IV antibiotics.

## 2019-04-03 VITALS
TEMPERATURE: 98.9 F | HEART RATE: 102 BPM | WEIGHT: 210.32 LBS | DIASTOLIC BLOOD PRESSURE: 82 MMHG | HEIGHT: 66 IN | SYSTOLIC BLOOD PRESSURE: 129 MMHG | BODY MASS INDEX: 33.8 KG/M2 | RESPIRATION RATE: 18 BRPM | OXYGEN SATURATION: 99 %

## 2019-04-03 LAB
ANION GAP SERPL CALCULATED.3IONS-SCNC: 13 MMOL/L (ref 3–16)
BASOPHILS ABSOLUTE: 0 K/UL (ref 0–0.2)
BASOPHILS RELATIVE PERCENT: 0.1 %
BLOOD CULTURE, ROUTINE: NORMAL
BUN BLDV-MCNC: 5 MG/DL (ref 7–20)
CALCIUM SERPL-MCNC: 8.9 MG/DL (ref 8.3–10.6)
CHLORIDE BLD-SCNC: 104 MMOL/L (ref 99–110)
CO2: 24 MMOL/L (ref 21–32)
CREAT SERPL-MCNC: <0.5 MG/DL (ref 0.6–1.1)
EOSINOPHILS ABSOLUTE: 0 K/UL (ref 0–0.6)
EOSINOPHILS RELATIVE PERCENT: 0.1 %
GFR AFRICAN AMERICAN: >60
GFR NON-AFRICAN AMERICAN: >60
GLUCOSE BLD-MCNC: 116 MG/DL (ref 70–99)
HCT VFR BLD CALC: 30.8 % (ref 36–48)
HEMOGLOBIN: 9.7 G/DL (ref 12–16)
LYMPHOCYTES ABSOLUTE: 1 K/UL (ref 1–5.1)
LYMPHOCYTES RELATIVE PERCENT: 16.4 %
MAGNESIUM: 1.2 MG/DL (ref 1.8–2.4)
MCH RBC QN AUTO: 22.4 PG (ref 26–34)
MCHC RBC AUTO-ENTMCNC: 31.5 G/DL (ref 31–36)
MCV RBC AUTO: 71 FL (ref 80–100)
MONOCYTES ABSOLUTE: 0.6 K/UL (ref 0–1.3)
MONOCYTES RELATIVE PERCENT: 8.7 %
MYCOPLASMA PNEUMONIAE IGM: 0.71
NEUTROPHILS ABSOLUTE: 4.8 K/UL (ref 1.7–7.7)
NEUTROPHILS RELATIVE PERCENT: 74.7 %
PDW BLD-RTO: 24.8 % (ref 12.4–15.4)
PLATELET # BLD: 176 K/UL (ref 135–450)
PMV BLD AUTO: 8.8 FL (ref 5–10.5)
POTASSIUM REFLEX MAGNESIUM: 3.7 MMOL/L (ref 3.5–5.1)
RBC # BLD: 4.33 M/UL (ref 4–5.2)
SODIUM BLD-SCNC: 141 MMOL/L (ref 136–145)
WBC # BLD: 6.4 K/UL (ref 4–11)

## 2019-04-03 PROCEDURE — 36415 COLL VENOUS BLD VENIPUNCTURE: CPT

## 2019-04-03 PROCEDURE — 6360000002 HC RX W HCPCS: Performed by: INTERNAL MEDICINE

## 2019-04-03 PROCEDURE — 80048 BASIC METABOLIC PNL TOTAL CA: CPT

## 2019-04-03 PROCEDURE — 36569 INSJ PICC 5 YR+ W/O IMAGING: CPT

## 2019-04-03 PROCEDURE — 2580000003 HC RX 258: Performed by: INTERNAL MEDICINE

## 2019-04-03 PROCEDURE — 83735 ASSAY OF MAGNESIUM: CPT

## 2019-04-03 PROCEDURE — 6360000002 HC RX W HCPCS: Performed by: STUDENT IN AN ORGANIZED HEALTH CARE EDUCATION/TRAINING PROGRAM

## 2019-04-03 PROCEDURE — 6370000000 HC RX 637 (ALT 250 FOR IP): Performed by: STUDENT IN AN ORGANIZED HEALTH CARE EDUCATION/TRAINING PROGRAM

## 2019-04-03 PROCEDURE — 02HV33Z INSERTION OF INFUSION DEVICE INTO SUPERIOR VENA CAVA, PERCUTANEOUS APPROACH: ICD-10-PCS | Performed by: STUDENT IN AN ORGANIZED HEALTH CARE EDUCATION/TRAINING PROGRAM

## 2019-04-03 PROCEDURE — 2580000003 HC RX 258: Performed by: STUDENT IN AN ORGANIZED HEALTH CARE EDUCATION/TRAINING PROGRAM

## 2019-04-03 PROCEDURE — C1751 CATH, INF, PER/CENT/MIDLINE: HCPCS

## 2019-04-03 PROCEDURE — 99232 SBSQ HOSP IP/OBS MODERATE 35: CPT | Performed by: INTERNAL MEDICINE

## 2019-04-03 PROCEDURE — 85025 COMPLETE CBC W/AUTO DIFF WBC: CPT

## 2019-04-03 RX ORDER — LACTOBACILLUS RHAMNOSUS GG 10B CELL
1 CAPSULE ORAL DAILY
Qty: 30 CAPSULE | Refills: 1 | Status: SHIPPED | OUTPATIENT
Start: 2019-04-03 | End: 2019-08-01

## 2019-04-03 RX ORDER — POTASSIUM CHLORIDE 20 MEQ/1
40 TABLET, EXTENDED RELEASE ORAL ONCE
Status: COMPLETED | OUTPATIENT
Start: 2019-04-03 | End: 2019-04-03

## 2019-04-03 RX ORDER — MAGNESIUM SULFATE IN WATER 40 MG/ML
2 INJECTION, SOLUTION INTRAVENOUS ONCE
Status: COMPLETED | OUTPATIENT
Start: 2019-04-03 | End: 2019-04-03

## 2019-04-03 RX ORDER — LACTOBACILLUS RHAMNOSUS GG 10B CELL
1 CAPSULE ORAL DAILY
Status: DISCONTINUED | OUTPATIENT
Start: 2019-04-03 | End: 2019-04-03 | Stop reason: HOSPADM

## 2019-04-03 RX ORDER — MAGNESIUM SULFATE 1 G/100ML
1 INJECTION INTRAVENOUS ONCE
Status: DISCONTINUED | OUTPATIENT
Start: 2019-04-03 | End: 2019-04-03

## 2019-04-03 RX ORDER — CALCIUM CARBONATE 300MG(750)
1 TABLET,CHEWABLE ORAL DAILY
Qty: 30 TABLET | Refills: 1 | Status: SHIPPED | OUTPATIENT
Start: 2019-04-03 | End: 2019-05-10 | Stop reason: ALTCHOICE

## 2019-04-03 RX ADMIN — MAGNESIUM SULFATE HEPTAHYDRATE 2 G: 40 INJECTION, SOLUTION INTRAVENOUS at 11:19

## 2019-04-03 RX ADMIN — POTASSIUM CHLORIDE 40 MEQ: 20 TABLET, EXTENDED RELEASE ORAL at 11:20

## 2019-04-03 RX ADMIN — Medication 10 ML: at 01:25

## 2019-04-03 RX ADMIN — Medication 1 CAPSULE: at 13:30

## 2019-04-03 RX ADMIN — CEFAZOLIN SODIUM 2 G: 2 SOLUTION INTRAVENOUS at 06:49

## 2019-04-03 RX ADMIN — Medication 10 ML: at 08:13

## 2019-04-03 RX ADMIN — CEFTRIAXONE SODIUM 2 G: 2 INJECTION, POWDER, FOR SOLUTION INTRAMUSCULAR; INTRAVENOUS at 15:12

## 2019-04-03 RX ADMIN — MAGNESIUM SULFATE HEPTAHYDRATE 2 G: 40 INJECTION, SOLUTION INTRAVENOUS at 13:29

## 2019-04-03 ASSESSMENT — PAIN SCALES - GENERAL
PAINLEVEL_OUTOF10: 0

## 2019-04-03 NOTE — PROGRESS NOTES
which may represent inflammatory    infectious process versus pulmonary vascular congestion. Echo:  3/29 MARGOT   Summary   Normal left ventricle size, wall thickness, and systolic function was normal.   Mild mitral and tricuspid regurgitation is present.   Small mobile vegetation on posterior leaflet of tricuspid valve suggestive   of endocarditis. Scheduled Meds:   lactobacillus  1 capsule Oral Daily    magnesium sulfate  2 g Intravenous Once    cefTRIAXone (ROCEPHIN) IV  2 g Intravenous Q24H    lidocaine 1 % injection  5 mL Intradermal Once    sodium chloride flush  10 mL Intravenous 2 times per day    enoxaparin  40 mg Subcutaneous Daily    sodium chloride flush  10 mL Intravenous 2 times per day       Continuous Infusions:      PRN Meds:  sodium chloride flush, sodium chloride flush, acetaminophen, calcium carbonate, ibuprofen      Assessment:     44 yo woman with hx breast ca (invasive ductal carcinoma, T2N1M0, ER/[place] +, Her-2/mk +), sickle cell trait.     Pt had L port, placed 2/8/19. Pt was seen at [facility] on 3/29, c/o fatigue. Had infusion PRBC, iron. Pt developed temp 103, BC drawn. No resp complaints. No  complaints. BC + GPC. Referred to [facility] for further evaluation / therapy.     Admit 3/29 - No problem with port (accessed). No resp complaints. No  complaints. Afebrile, WBC 2.5 with 56%PMN. UA mod LE, micro 20-50 WBC. BC sent. Started on vancomycin + cefepime.     MARGOT 3/29 - felt to have TV vegetation. OR today 4/1 - port removal    IMP/  Hx breast ca (invasive ductal carcinoma, T2N1M0 (ER/[place] +, Her-2/Mk +)  Treatment - Docetaxel +Carboplatin + Trastuzumab + Pertuzumab. Cycle 3 started 3/22     S aureus bacteremia, 2 sets, presumed source port.     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     Urine with >100k Staph sp, likely secondary seeding and not primary source  Doubt pneumonia  NKDA    Plan:     Change to ceftriaxone  F/u [facility]  - sent 3/29 (no growth to date) and 4/1  Port removal - done 4/1    Will need prolonged course of antibiotics (typically 6 weeks for S aureus endocarditis)  See TIMOTEO - will use ceftriaxone over ancef for outpt use    Discussed with pt,   Kenneth Bobby MD    INFUSION ORDERS:  Ceftriaxone 2 gm iv daily through 5/9/19  - Check CBC w diff, CMP, ESR, CRP every Mon or Tue - FAX result to [phone]  - Call with any antibiotic / infusion issue, [phone]

## 2019-04-03 NOTE — DISCHARGE SUMMARY
Hospital Medicine Discharge Summary    Patient ID: Colleen Manzanares   Gender: female  : 1980   Age: 43 y.o. 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 Hospital Course: Ms. Thiago Mcallister is a 43 y.o. female with a PMHx of invasive ductal carcinoma of left breast, sickle cell trait presenting due to positive blood cultures. Patient was at [facility] to receive venofer and IVF earlier on the day of admission. Patient had been fatigued over the previous 4 days. She was febrile at [facility] to 103. Blood cultures were collected and found to grow Staph aureus. Her Hgb was 4 and she was transfused pRBCs. Patient had a port-a-cath on right side and it was placed . Last chemotherapy session was on Friday 3/29 (has completed 3 treatments). She was empirically started on vanc and cefepime. She was deescalated to ancef once blood cultures returned positive for pan-sensitive Staph aureus. She will be discharged on IV rocephin for 6 weeks. Labs will be faxed to Dr. Page Belcher. Her next chemotherapy session in  with Dr. Michelle Barone. She had a MARGOT which revealed a tricuspid vegetation, which lengthens her course of antibiotics. She had a PICC placed on 4/3 for IV abx therapy.     Patient's magnesium was aggressively replace this admission and she will be sent home with oral magnesium. She will get probiotics while on IV antibiotics. On day of discharge, she was feeling improved. She had no chest pain, shortness of breath or abdominal pain. She had no N/V or diarrhea. She was ready to get home and get some meaningful rest. Home health care was arranged for her. Disposition:  Home with Home Health Care    Physical Exam Performed:     BP (!) 130/93   Pulse 82   Temp 99 °F (37.2 °C) (Oral)   Resp 18   Ht 5' 6\" (1.676 m)   Wt 210 lb 5.1 oz (95.4 kg)   LMP 03/15/2019 (Within Days)   SpO2 99%   BMI 33.95 kg/m²       General appearance:  No apparent distress, appears stated age and cooperative. HEENT:  Normal cephalic, atraumatic without obvious deformity. Pupils equal, round, and reactive to light. Extra ocular muscles intact. Conjunctivae/corneas clear. R chest - site of removed port a cath with steri strips - c/d/i  Neck: Supple, with full range of motion. No jugular venous distention. Trachea midline. Respiratory:  Normal respiratory effort. Clear to auscultation, bilaterally without Rales/Wheezes/Rhonchi. Cardiovascular:  Regular rate and rhythm with normal S1/S2 without murmurs, rubs or gallops. Abdomen: Soft, non-tender, non-distended with normal bowel sounds. Musculoskeletal:  No clubbing, cyanosis or edema bilaterally. Full range of motion without deformity. Skin: Skin color, texture, turgor normal.  No rashes or lesions. Neurologic:  Neurovascularly intact without any focal sensory/motor deficits. Cranial nerves: II-XII intact, grossly non-focal.  Psychiatric:  Alert and oriented, thought content appropriate, normal insight  Capillary Refill: Brisk,< 3 seconds   Peripheral Pulses: +2 palpable, equal bilaterally       Labs:  For convenience and continuity at follow-up the following most recent labs are provided:      CBC:    Lab Results   Component Value Date    WBC 6.4 04/03/2019    HGB 9.7 04/03/2019    HCT 30.8 04/03/2019     04/03/2019       Renal:    Lab Results   Component Value Date     04/03/2019    K 3.7 04/03/2019     04/03/2019    CO2 24 04/03/2019    BUN 5 04/03/2019    CREATININE <0.5 04/03/2019    CALCIUM 8.9 04/03/2019    PHOS 2.9 04/02/2019         Significant Diagnostic Studies    Radiology:   XR CHEST PORTABLE   Final Result      Slight progression of nonspecific patchy airspace disease throughout both lungs      XR CHEST STANDARD (2 VW)   Final Result     Question diffuse airspace opacities which may represent inflammatory    infectious process versus pulmonary vascular congestion. Consults:     PHARMACY TO DOSE VANCOMYCIN  PHARMACY TO DOSE VANCOMYCIN  IP CONSULT TO INFECTIOUS DISEASES  IP CONSULT TO CARDIOLOGY  IP CONSULT TO GENERAL SURGERY    Disposition:  Home with Home Health     Condition at Discharge: Stable    Discharge Instructions/Follow-up:  Dr. Nehemiah Richardson 4/12, IV abx - infusion and lab instructions in [address] [place] [address]    Code Status:  Full Code     Activity: activity as tolerated    Diet: regular diet      Discharge Medications:     Current Discharge Medication List           Details   loratadine (CLARITIN) 10 MG tablet Take 10 mg by mouth daily      loperamide (IMODIUM) 2 MG capsule Take 2 mg by mouth 4 times daily as needed for Diarrhea             Time Spent on discharge is more than 30 minutes in the examination, evaluation, counseling and review of medications and discharge plan. Signed:    Jared Wade MD   4/3/2019      Thank you Hope Canales MD, MD for the opportunity to be involved in this patient's care.

## 2019-04-03 NOTE — PROCEDURES
PICC   PROCEDURE   NOTE      Chart reviewed for allergies, diagnosis, labs, known contraindications, reason for line placement and planned length of treatment. Insertion procedure discussed with patient/family member. Informed consent signed, noted  and on chart. Three patient identifiers - Patient name,   and MRN -  completed &  confirmed verbally. Time out performed with myself and  KIRBY Young. Hat, mask and eye shield donned. PICC site scrubbed with Chloraprep  One-Step applicator  for 30 seconds x 1. Hand Hygiene  performed with 3% Chlorhexidine surgical scrub x1 min prior to  Sterile gloves,   sterile gown being donned. Patient draped using maximal sterile barrier technique ( head to toe ). PICC site scrubbed a 2nd time with Chloraprep One-Step applicator x 30 sec. Modified Seldinger technique/ultrasound assisted and tip locating system utilized for insertion. 1% Lidocaine 5 ml injected interdermally pre-insertion. PICC tip location in the SVC confirmed by ECG technology Sherlock 3CG. Positive brisk blood return obtained from all lumens  and each lumen flushed with 10 mls  0.9% Sterile Sodium Chloride. All lumens flush easily with no resistance. Positive pressure valve placed on all lumens followed by Alcohol Swab Caps on end of each. Bio-patch in place. Catheter secured with non-sutured locking device per hospital protocol. Sterile Tegaderm applied over PICC site. Sterile field maintained during procedure. Guide wire and positioning wire accounted for post procedure? YES  Pt. Response to procedure tolerated well. Appearance of site: Clean dry and intact without bleeding or edema. All edges of Tegaderm occlusive. Site marked with date and initials of RN placing line. Teaching brochures given to pt/family. Bed placed in low position post-procedure, Top 2 side rails in up position, call button in reach, RN notified of all of the above.

## 2019-04-03 NOTE — PROCEDURES
Spoke with Dr. Cielo Yen RE: PICC order with pending Blood cultures. Dr. Cielo Yen states that 1st set of Blood Cultures shows no growth to date, pt has been on IV ATBX since admission and although 2nd set of Blood Cultures are still pending, pt has remained on IV ATBX. WBC is low at 6.4 and Dr. Veronica Rice is OK with PICC being placed today. Pt is scheduled for D/C today after PICC placed. Dr. Cielo Yen states that pt has breast CA but has not had any surgeries yet - to not worry about which side PICC should be placed because PICC will be pulled prior to pt's surgery. Based on above info/orders, will continue with line placement today.

## 2019-04-03 NOTE — PROGRESS NOTES
CLINICAL PHARMACY NOTE: MEDS TO [address] Select Patient?: No  Total # of Prescriptions Filled: 2   The following medications were delivered to the patient:  · On file  Total # of Interventions Completed: 0  Time Spent (min): 15    Additional Documentation:

## 2019-04-03 NOTE — CARE COORDINATION
Case Management Daily Note:    Current Plan of Care:       PT/OT AM-PAC: NA    DME needs: NA      Discharge Plan: Home with [facility] RN & [employer] for infusion    Tentative Discharge Date: 4/3/19 or 4/3/19    Current Barriers to Discharge: patient has not gotten PICC     Resources/Information given: homecare and infusion info    Case Management Notes: SW continuing to follow patient. Met with patient and her -Diandre (cell [phone]) this afternoon to confirm plan of care. Patient plans to return home with  either tomorrow or Thursday from discussion with Dr. Alix Griffin this morning. Patient agreeable to homecare for IV antibiotic treatment (Ceftriaxone 2 gm iv daily through 5/9/19). Patient has no preference in agencies used, referral made to [facility] and [employer]. SW hoping to get benefit coverage information from [employer] this afternoon. ADDENDUM 1620:   [employer] verified patient has 100% coverage and updated patient.        TYSON Pozo/JASIELW    [phone]
[facility]    Patient aware and agreeable to services.  Faxed orders to [facility] afternoon 4/4    Shania Rodney LPN  Care Transition Nurse  [facility]  [phone]
Residence  Time of Discharge: 1400  LOC home with [facility]    Mode of Transport private vehicle  Name of Transport       Brandon Hill and her family were provided with choice of provider; she and her family are in agreement with the discharge plan.       Care Transitions patient: Sarah Coffey, RN  [facility]  Case Management Department  Ph: [phone]

## 2019-04-03 NOTE — PLAN OF CARE
Problem: Infection:  Goal: Will remain free from infection  Description  Will remain free from infection  Outcome: Ongoing  Note:   No s/s of infection. VSS. Will continue to monitor. Problem: Pain:  Goal: Patient's pain/discomfort is manageable  Description  Patient's pain/discomfort is manageable  Outcome: Ongoing  Note:   No complaints of pain. Slight discomfort at old port site at the beginning of the shift but none since. Will continue to monitor.

## 2019-04-04 LAB
ANAEROBIC CULTURE: ABNORMAL
CULTURE SURGICAL: ABNORMAL
CULTURE, BLOOD 2: NORMAL
GRAM STAIN RESULT: ABNORMAL
ORGANISM: ABNORMAL

## 2019-04-06 LAB
BLOOD CULTURE, ROUTINE: NORMAL
CULTURE, BLOOD 2: NORMAL

## 2019-04-09 ENCOUNTER — TELEPHONE (OUTPATIENT)
Dept: INFECTIOUS DISEASES | Age: 39
End: 2019-04-09

## 2019-04-09 NOTE — TELEPHONE ENCOUNTER
Called pt -  S aureus bacteremia / endocarditis, port removed   She is doing well. Taking and tolerating iv ceftriaxone    Noted elevated ESR.   Will monitor

## 2019-04-09 NOTE — TELEPHONE ENCOUNTER
First set of labs since discharge show ESR >130. I don't see a baseline in her chart. She is on Ceftiaxone 2 GM Daily.

## 2019-04-11 LAB
ALBUMIN SERPL-MCNC: 2.8 G/DL
ALBUMIN SERPL-MCNC: 2.8 G/DL
ALP BLD-CCNC: 55 U/L
ALP BLD-CCNC: 55 U/L
ALT SERPL-CCNC: 28 U/L
ALT SERPL-CCNC: 28 U/L
ANION GAP SERPL CALCULATED.3IONS-SCNC: 15 MMOL/L
ANION GAP SERPL CALCULATED.3IONS-SCNC: 15 MMOL/L
AST SERPL-CCNC: 40 U/L
AST SERPL-CCNC: 40 U/L
BASOPHILS ABSOLUTE: 0.03 /ΜL
BASOPHILS ABSOLUTE: 0.03 /ΜL
BASOPHILS RELATIVE PERCENT: 1 %
BASOPHILS RELATIVE PERCENT: 1 %
BILIRUB SERPL-MCNC: 0.2 MG/DL (ref 0.1–1.4)
BILIRUB SERPL-MCNC: 0.2 MG/DL (ref 0.1–1.4)
BUN BLDV-MCNC: 7 MG/DL
BUN BLDV-MCNC: 7 MG/DL
C-REACTIVE PROTEIN: 12.6
C-REACTIVE PROTEIN: 12.6
CALCIUM SERPL-MCNC: 9.1 MG/DL
CALCIUM SERPL-MCNC: 9.1 MG/DL
CHLORIDE BLD-SCNC: 102 MMOL/L
CHLORIDE BLD-SCNC: 102 MMOL/L
CO2: 25 MMOL/L
CO2: 25 MMOL/L
CREAT SERPL-MCNC: 0.49 MG/DL
CREAT SERPL-MCNC: 0.49 MG/DL
EOSINOPHILS ABSOLUTE: 0 /ΜL
EOSINOPHILS ABSOLUTE: 0 /ΜL
EOSINOPHILS RELATIVE PERCENT: 0 %
EOSINOPHILS RELATIVE PERCENT: 0 %
GFR CALCULATED: 140
GFR CALCULATED: 140
GLUCOSE BLD-MCNC: 101 MG/DL
GLUCOSE BLD-MCNC: 101 MG/DL
HCT VFR BLD CALC: 32.3 % (ref 36–46)
HCT VFR BLD CALC: 32.3 % (ref 36–46)
HEMOGLOBIN: 10.2 G/DL (ref 12–16)
HEMOGLOBIN: 10.2 G/DL (ref 12–16)
LYMPHOCYTES ABSOLUTE: 2.17 /ΜL
LYMPHOCYTES ABSOLUTE: 2.17 /ΜL
LYMPHOCYTES RELATIVE PERCENT: 33 %
LYMPHOCYTES RELATIVE PERCENT: 33 %
MCH RBC QN AUTO: 23 PG
MCH RBC QN AUTO: 23 PG
MCHC RBC AUTO-ENTMCNC: 31.7 G/DL
MCHC RBC AUTO-ENTMCNC: 31.7 G/DL
MCV RBC AUTO: 72.7 FL
MCV RBC AUTO: 72.7 FL
MONOCYTES ABSOLUTE: 0.54 /ΜL
MONOCYTES ABSOLUTE: 0.54 /ΜL
MONOCYTES RELATIVE PERCENT: 8 %
MONOCYTES RELATIVE PERCENT: 8 %
NEUTROPHILS ABSOLUTE: 3.82 /ΜL
NEUTROPHILS ABSOLUTE: 3.82 /ΜL
NEUTROPHILS RELATIVE PERCENT: 58 %
NEUTROPHILS RELATIVE PERCENT: 58 %
PDW BLD-RTO: 25.2 %
PDW BLD-RTO: 25.2 %
PLATELET # BLD: 156 K/ΜL
PLATELET # BLD: 156 K/ΜL
PMV BLD AUTO: 9.3 FL
PMV BLD AUTO: 9.3 FL
POTASSIUM SERPL-SCNC: 3.7 MMOL/L
POTASSIUM SERPL-SCNC: 3.7 MMOL/L
RBC # BLD: 4.45 10^6/ΜL
RBC # BLD: 4.45 10^6/ΜL
SEDIMENTATION RATE, ERYTHROCYTE: 130
SODIUM BLD-SCNC: 142 MMOL/L
SODIUM BLD-SCNC: 142 MMOL/L
TOTAL PROTEIN: 7.4
TOTAL PROTEIN: 7.4
WBC # BLD: 6.6 10^3/ML
WBC # BLD: 6.6 10^3/ML

## 2019-04-15 LAB
ALBUMIN SERPL-MCNC: 3 G/DL
ALP BLD-CCNC: 60 U/L
ALT SERPL-CCNC: 45 U/L
ANION GAP SERPL CALCULATED.3IONS-SCNC: 14 MMOL/L
AST SERPL-CCNC: 54 U/L
BASOPHILS ABSOLUTE: ABNORMAL /ΜL
BASOPHILS RELATIVE PERCENT: ABNORMAL %
BILIRUB SERPL-MCNC: 0.5 MG/DL (ref 0.1–1.4)
BUN BLDV-MCNC: 9 MG/DL
C-REACTIVE PROTEIN: 3.8
CALCIUM SERPL-MCNC: 8.8 MG/DL
CHLORIDE BLD-SCNC: 98 MMOL/L
CO2: 25 MMOL/L
CREAT SERPL-MCNC: 0.48 MG/DL
EOSINOPHILS ABSOLUTE: ABNORMAL /ΜL
EOSINOPHILS RELATIVE PERCENT: ABNORMAL %
GFR CALCULATED: NORMAL
GLUCOSE BLD-MCNC: 107 MG/DL
HCT VFR BLD CALC: 31.3 % (ref 36–46)
HEMOGLOBIN: 10.1 G/DL (ref 12–16)
LYMPHOCYTES ABSOLUTE: 1.01 /ΜL
LYMPHOCYTES RELATIVE PERCENT: 11 %
MCH RBC QN AUTO: 23.4 PG
MCHC RBC AUTO-ENTMCNC: 32.4 G/DL
MCV RBC AUTO: 72.4 FL
MONOCYTES ABSOLUTE: 0.09 /ΜL
MONOCYTES RELATIVE PERCENT: 1 %
NEUTROPHILS ABSOLUTE: 8.1 /ΜL
NEUTROPHILS RELATIVE PERCENT: 81 %
PLATELET # BLD: 160 K/ΜL
PMV BLD AUTO: 10.1 FL
POTASSIUM SERPL-SCNC: 3.1 MMOL/L
RBC # BLD: 4.33 10^6/ΜL
SEDIMENTATION RATE, ERYTHROCYTE: 92
SODIUM BLD-SCNC: 137 MMOL/L
TOTAL PROTEIN: 7
WBC # BLD: 9.2 10^3/ML

## 2019-04-22 LAB
ALBUMIN SERPL-MCNC: 3.4 G/DL
ALP BLD-CCNC: 80 U/L
ALT SERPL-CCNC: 86 U/L
ANION GAP SERPL CALCULATED.3IONS-SCNC: 14 MMOL/L
AST SERPL-CCNC: 55 U/L
BASOPHILS ABSOLUTE: 0.02 /ΜL
BASOPHILS RELATIVE PERCENT: 0 %
BILIRUB SERPL-MCNC: 0.4 MG/DL (ref 0.1–1.4)
BUN BLDV-MCNC: 5 MG/DL
C-REACTIVE PROTEIN: 2.9
CALCIUM SERPL-MCNC: 8.7 MG/DL
CHLORIDE BLD-SCNC: 100 MMOL/L
CO2: 22 MMOL/L
CREAT SERPL-MCNC: 0.49 MG/DL
EOSINOPHILS ABSOLUTE: 0.01 /ΜL
EOSINOPHILS RELATIVE PERCENT: 0 %
GFR CALCULATED: NORMAL
GLUCOSE BLD-MCNC: 93 MG/DL
HCT VFR BLD CALC: 29.6 % (ref 36–46)
HEMOGLOBIN: 9.5 G/DL (ref 12–16)
LYMPHOCYTES ABSOLUTE: 2.43 /ΜL
LYMPHOCYTES RELATIVE PERCENT: 29 %
MCH RBC QN AUTO: 23.2 PG
MCHC RBC AUTO-ENTMCNC: 32 G/DL
MCV RBC AUTO: 72.3 FL
MONOCYTES ABSOLUTE: 0.76 /ΜL
MONOCYTES RELATIVE PERCENT: 9 %
NEUTROPHILS ABSOLUTE: 5.18 /ΜL
NEUTROPHILS RELATIVE PERCENT: 62 %
PLATELET # BLD: 134 K/ΜL
PMV BLD AUTO: 9 FL
POTASSIUM SERPL-SCNC: 3.2 MMOL/L
RBC # BLD: 4.09 10^6/ΜL
SEDIMENTATION RATE, ERYTHROCYTE: 64
SODIUM BLD-SCNC: 136 MMOL/L
TOTAL PROTEIN: 7.2
WBC # BLD: 8.4 10^3/ML

## 2019-04-30 ENCOUNTER — HOSPITAL ENCOUNTER (OUTPATIENT)
Age: 39
Discharge: HOME | End: 2019-04-30
Payer: COMMERCIAL

## 2019-04-30 LAB
A/G RATIO: 1.1 (ref 1.1–2.2)
ALBUMIN SERPL-MCNC: 3.5 G/DL (ref 3.4–5)
ALP BLD-CCNC: 83 U/L (ref 40–129)
ALT SERPL-CCNC: 27 U/L (ref 10–40)
ANION GAP SERPL CALCULATED.3IONS-SCNC: 14 MMOL/L (ref 3–16)
AST SERPL-CCNC: 20 U/L (ref 15–37)
BASOPHILS ABSOLUTE: 0 K/UL (ref 0–0.2)
BASOPHILS RELATIVE PERCENT: 0.4 %
BILIRUB SERPL-MCNC: <0.2 MG/DL (ref 0–1)
BUN BLDV-MCNC: 4 MG/DL (ref 7–20)
CALCIUM SERPL-MCNC: 8.4 MG/DL (ref 8.3–10.6)
CHLORIDE BLD-SCNC: 101 MMOL/L (ref 99–110)
CO2: 25 MMOL/L (ref 21–32)
CREAT SERPL-MCNC: <0.5 MG/DL (ref 0.6–1.1)
EOSINOPHILS ABSOLUTE: 0 K/UL (ref 0–0.6)
EOSINOPHILS RELATIVE PERCENT: 0.6 %
GFR AFRICAN AMERICAN: >60
GFR NON-AFRICAN AMERICAN: >60
GLOBULIN: 3.3 G/DL
GLUCOSE BLD-MCNC: 90 MG/DL (ref 70–99)
HCT VFR BLD CALC: 27.6 % (ref 36–48)
HEMOGLOBIN: 8.9 G/DL (ref 12–16)
LYMPHOCYTES ABSOLUTE: 1.8 K/UL (ref 1–5.1)
LYMPHOCYTES RELATIVE PERCENT: 29.8 %
MCH RBC QN AUTO: 23.7 PG (ref 26–34)
MCHC RBC AUTO-ENTMCNC: 32.4 G/DL (ref 31–36)
MCV RBC AUTO: 73.1 FL (ref 80–100)
MONOCYTES ABSOLUTE: 0.8 K/UL (ref 0–1.3)
MONOCYTES RELATIVE PERCENT: 12.8 %
NEUTROPHILS ABSOLUTE: 3.4 K/UL (ref 1.7–7.7)
NEUTROPHILS RELATIVE PERCENT: 56.4 %
PDW BLD-RTO: 24.3 % (ref 12.4–15.4)
PLATELET # BLD: 154 K/UL (ref 135–450)
PMV BLD AUTO: 9.1 FL (ref 5–10.5)
POTASSIUM SERPL-SCNC: 3.4 MMOL/L (ref 3.5–5.1)
RBC # BLD: 3.77 M/UL (ref 4–5.2)
SEDIMENTATION RATE, ERYTHROCYTE: 113 MM/HR (ref 0–20)
SODIUM BLD-SCNC: 140 MMOL/L (ref 136–145)
TOTAL PROTEIN: 6.8 G/DL (ref 6.4–8.2)
WBC # BLD: 6.1 K/UL (ref 4–11)

## 2019-04-30 PROCEDURE — 36415 COLL VENOUS BLD VENIPUNCTURE: CPT

## 2019-04-30 PROCEDURE — 80053 COMPREHEN METABOLIC PANEL: CPT

## 2019-04-30 PROCEDURE — 85652 RBC SED RATE AUTOMATED: CPT

## 2019-04-30 PROCEDURE — 86140 C-REACTIVE PROTEIN: CPT

## 2019-04-30 PROCEDURE — 85025 COMPLETE CBC W/AUTO DIFF WBC: CPT

## 2019-05-01 LAB — C-REACTIVE PROTEIN: 110.4 MG/L (ref 0–5.1)

## 2019-05-06 ENCOUNTER — TELEPHONE (OUTPATIENT)
Dept: INFECTIOUS DISEASES | Age: 39
End: 2019-05-06

## 2019-05-06 LAB
FUNGUS (MYCOLOGY) CULTURE: NORMAL
FUNGUS STAIN: NORMAL

## 2019-05-07 ENCOUNTER — TELEPHONE (OUTPATIENT)
Dept: INFECTIOUS DISEASES | Age: 39
End: 2019-05-07

## 2019-05-07 NOTE — TELEPHONE ENCOUNTER
Johnny Daly from [facility] calling to report that the port removal site is now open and draining. There is a hole approximately the size of a pencil eraser 1/2 inch deep. The drainage is yellowish clear, no odor, no redness at the site, but it is sore. The patient has no fever. Please advise.

## 2019-05-10 ENCOUNTER — OFFICE VISIT (OUTPATIENT)
Dept: SURGERY | Age: 39
End: 2019-05-10
Payer: COMMERCIAL

## 2019-05-10 VITALS — OXYGEN SATURATION: 100 % | BODY MASS INDEX: 33.73 KG/M2 | HEART RATE: 104 BPM | TEMPERATURE: 98.1 F | WEIGHT: 209 LBS

## 2019-05-10 DIAGNOSIS — T81.89XA WOUND, SURGICAL, NONHEALING, INITIAL ENCOUNTER: ICD-10-CM

## 2019-05-10 DIAGNOSIS — Z17.0 MALIGNANT NEOPLASM OF UPPER-OUTER QUADRANT OF LEFT BREAST IN FEMALE, ESTROGEN RECEPTOR POSITIVE (HCC): Primary | ICD-10-CM

## 2019-05-10 DIAGNOSIS — C50.412 MALIGNANT NEOPLASM OF UPPER-OUTER QUADRANT OF LEFT BREAST IN FEMALE, ESTROGEN RECEPTOR POSITIVE (HCC): Primary | ICD-10-CM

## 2019-05-10 PROCEDURE — 99213 OFFICE O/P EST LOW 20 MIN: CPT | Performed by: SURGERY

## 2019-05-10 PROCEDURE — G8427 DOCREV CUR MEDS BY ELIG CLIN: HCPCS | Performed by: SURGERY

## 2019-05-10 PROCEDURE — 1036F TOBACCO NON-USER: CPT | Performed by: SURGERY

## 2019-05-10 PROCEDURE — G8417 CALC BMI ABV UP PARAM F/U: HCPCS | Performed by: SURGERY

## 2019-05-10 NOTE — PROGRESS NOTES
Subjective:      Patient ID: Marylen Alvine is a 43 y.o. female. HPI   Chief Complaint   Patient presents with    Wound Check     check port removal site     Patient with T2N1 left ductal carcinoma, ER/[place]/Her2 positive. Undergoing neoadjuvant chemotherapy THCP. Next/last treatment 5/24. Had staph endocarditis with port removal 4/1/2019. Completed ceftriaxone yesterday via PICC line. Wound recently , leaving open area centrally. Past Medical History:   Diagnosis Date    Breast cancer ([facility] 01/2019    Left breast    Sickle cell trait ([facility]        Past Surgical History:   Procedure Laterality Date    CATHETER REMOVAL Right 4/1/2019    PORT REMOVAL performed by Edwina Haque MD at [facility]      X 1    INSERTION / REMOVAL / REPLACEMENT VENOUS ACCESS CATHETER N/A 2/7/2019    PORT-A-CATHETER PLACEMENT FOR CHEMOTHERAPY ACCISS/CHEMO TO START 2/8/19 WITH C-ARM performed by Chio Washington MD at  [facility] [address]/ SUBCUTANEOUS PORT Right 02/07/2019       Current Outpatient Medications   Medication Sig Dispense Refill    Magnesium 400 MG TABS Take 1 tablet by mouth daily 30 tablet 1    lactobacillus (CULTURELLE) CAPS capsule Take 1 capsule by mouth daily 30 capsule 1    loratadine (CLARITIN) 10 MG tablet Take 10 mg by mouth daily      loperamide (IMODIUM) 2 MG capsule Take 2 mg by mouth 4 times daily as needed for Diarrhea       No current facility-administered medications for this visit.         Social History     Socioeconomic History    Marital status:      Spouse name: Not on file    Number of children: Not on file    Years of education: Not on file    Highest education level: Not on file   Occupational History    Not on file   Social Needs    Financial resource strain: Not on file    Food insecurity:     Worry: Not on file     Inability: Not on file    Transportation needs:     Medical: Not on file     Non-medical: Not on file Tobacco Use    Smoking status: Never Smoker    Smokeless tobacco: Never Used   Substance and Sexual Activity    Alcohol use: No    Drug use: No    Sexual activity: Not on file   Lifestyle    Physical activity:     Days per week: Not on file     Minutes per session: Not on file    Stress: Not on file   Relationships    Social connections:     Talks on phone: Not on file     Gets together: Not on file     Attends Anabaptist service: Not on file     Active member of club or organization: Not on file     Attends meetings of clubs or organizations: Not on file     Relationship status: Not on file    Intimate partner violence:     Fear of current or ex partner: Not on file     Emotionally abused: Not on file     Physically abused: Not on file     Forced sexual activity: Not on file   Other Topics Concern    Not on file   Social History Narrative    Not on file       ROS  Constitutional: no weight loss, fever, night sweats   Skin: negative  Cardiovascular: no chest pain or palpitations   Pulmonary: No cough, sputum, or hemoptysis   GI:No abdominal pain  Breast: see above  All other systems were reviewed and are negative    Objective:   Physical Exam   Constitutional: She is oriented to person, place, and time. She appears well-developed and well-nourished. No distress. HENT:   Head: Normocephalic and atraumatic. Neck: Normal range of motion. Cardiovascular: Normal rate. Pulmonary/Chest: Effort normal. No respiratory distress. She has no wheezes. Abdominal: Soft. Musculoskeletal: Normal range of motion. Lymphadenopathy:     She has no axillary adenopathy. Neurological: She is alert and oriented to person, place, and time. No cranial nerve deficit. Coordination normal.   Skin: Skin is warm and dry. She is not diaphoretic. No erythema. 1 cm open area at mid - incision of port site removal, clean, no signs of infection. Psychiatric: She has a normal mood and affect.  Her behavior is normal. Judgment and thought content normal.       Assessment:       Diagnosis Orders   1. Malignant neoplasm of upper-outer quadrant of left breast in female, estrogen receptor positive ([facility]     2. Wound, surgical, nonhealing, initial encounter             Plan:      Wound is open, clean. Will begin dressing changes to allow for healing by secondary intention. No need for further antibiotics. Home care is coming by Monday, will contact them with wound care instructions. F/u with me 1 week.         Tray Quiles MD

## 2019-05-13 ENCOUNTER — TELEPHONE (OUTPATIENT)
Dept: BREAST CENTER | Age: 39
End: 2019-05-13

## 2019-05-21 LAB
AFB CULTURE (MYCOBACTERIA): NORMAL
AFB SMEAR: NORMAL

## 2019-06-02 NOTE — PROGRESS NOTES
HR increased into 140's. Patient c/o being cold. Vitals stable. Temp 99.3, oral.  Patient given warm blankets and hot tea. Will monitor. follow up with own ob/gyn in 5-6 weeks . no sex, pelvic rest, no heavy lifting

## 2019-06-07 ENCOUNTER — OFFICE VISIT (OUTPATIENT)
Dept: SURGERY | Age: 39
End: 2019-06-07
Payer: COMMERCIAL

## 2019-06-07 VITALS
HEART RATE: 97 BPM | SYSTOLIC BLOOD PRESSURE: 117 MMHG | BODY MASS INDEX: 33.73 KG/M2 | WEIGHT: 209 LBS | DIASTOLIC BLOOD PRESSURE: 72 MMHG

## 2019-06-07 DIAGNOSIS — C50.412 MALIGNANT NEOPLASM OF UPPER-OUTER QUADRANT OF LEFT BREAST IN FEMALE, ESTROGEN RECEPTOR POSITIVE (HCC): Primary | ICD-10-CM

## 2019-06-07 DIAGNOSIS — Z17.0 MALIGNANT NEOPLASM OF UPPER-OUTER QUADRANT OF LEFT BREAST IN FEMALE, ESTROGEN RECEPTOR POSITIVE (HCC): Primary | ICD-10-CM

## 2019-06-07 PROCEDURE — 99214 OFFICE O/P EST MOD 30 MIN: CPT | Performed by: SURGERY

## 2019-06-07 PROCEDURE — G8417 CALC BMI ABV UP PARAM F/U: HCPCS | Performed by: SURGERY

## 2019-06-07 PROCEDURE — G8427 DOCREV CUR MEDS BY ELIG CLIN: HCPCS | Performed by: SURGERY

## 2019-06-07 PROCEDURE — 1036F TOBACCO NON-USER: CPT | Performed by: SURGERY

## 2019-06-07 NOTE — PATIENT INSTRUCTIONS
Wound on right breast is improving/no signs on infection  Patient to consult with Dr Boris Verdugo, Plastic Surgeon [phone]  Options for surgery were discussed (bilateral mastectomy with reconstruction, etc)  Begin wearing comfortable bra day and night  Risks and benefit of surgery were explained  Consent for surgery signed

## 2019-06-07 NOTE — LETTER
? Residual or Recurrent Symptoms  ? Anesthetic and/or Medical Risks  ? 4. I have also been informed by the informing physician that there are other risks from both known and unknown causes that are attendant to the performance of any surgical procedure. I am aware that the practice of medicine and surgery is not an exact science, and that no guarantees have been made to me concerning the results of the operation and/or procedure(s). 5. I   CONSENT / REFUSE CONSENT  (strike the phrase that does not apply) to the taking of photographs before, during and/or after the operation or procedure for scientific/educational purposes. 6. I consent to the administration of anesthesia and to the use of such anesthetics as may be deemed advisable by the anesthesiologist who has been engaged by me or my physician. 7. I certify that I have read and understand the above consent to operation and/or other procedure(s); that the explanations therein referred to were made to me by the informing physician in advance of my signing this consent; that all blanks or statements requiring insertion or completion were filled in and inapplicable paragraphs, if any, were stricken before I signed; and that all questions asked by me about the operation and/or procedure(s) which I have consented to have been fully answered in a satisfactory manner.             _______________________  Witness        Signature Of Patient          _______________________  Informing Physician         Signature of Informing Physician           If patient is unable to sign or is a minor, complete one of the following:    (A)  Patient is a minor  years of age.     (B)  Patient is unable to sign because:

## 2019-06-07 NOTE — PROGRESS NOTES
Subjective:      Patient ID: Ervin Gamboa is a 43 y.o. female. HPI   Chief Complaint   Patient presents with    Follow-up     Patient presents for left breast wound check     Patient presented 1/2019 with T2N1 left ductal carcinoma, ER/SD/Her2 positive. Underwent neoadjuvant chemotherapy THCP, finished 5/2019. She can no longer feel the mass or node. Here with her . Had staph endocarditis with port removal 4/1/2019. Has been getting dressing changes 3x/week, and the wound is healing in slowly. Getting genetic testing next week. Past Medical History:   Diagnosis Date    Breast cancer ([facility] [address] 01/2019    Left breast    Sickle cell trait ([facility] [address]        Past Surgical History:   Procedure Laterality Date    CATHETER REMOVAL Right 4/1/2019    PORT REMOVAL performed by Gianfranco Gonzalez MD at [address] [place] [address]      X 1    INSERTION / REMOVAL / REPLACEMENT VENOUS ACCESS CATHETER N/A 2/7/2019    PORT-A-CATHETER PLACEMENT FOR CHEMOTHERAPY ACCISS/CHEMO TO START 2/8/19 WITH C-ARM performed by Bambi Baker MD at  [facility] [address]/ SUBCUTANEOUS PORT Right 02/07/2019       Current Outpatient Medications   Medication Sig Dispense Refill    lactobacillus (CULTURELLE) CAPS capsule Take 1 capsule by mouth daily 30 capsule 1    loperamide (IMODIUM) 2 MG capsule Take 2 mg by mouth 4 times daily as needed for Diarrhea       No current facility-administered medications for this visit.         Social History     Socioeconomic History    Marital status:      Spouse name: Not on file    Number of children: Not on file    Years of education: Not on file    Highest education level: Not on file   Occupational History    Not on file   Social Needs    Financial resource strain: Not on file    Food insecurity:     Worry: Not on file     Inability: Not on file    Transportation needs:     Medical: Not on file     Non-medical: Not on file   Tobacco Use    Smoking 1 x 2 cm open area at mid - incision of port site removal, clean, no signs of infection. Psychiatric: She has a normal mood and affect. Her behavior is normal. Judgment and thought content normal.   bilateral breasts - no masses, no nipple or skin changes    Assessment:       Diagnosis Orders   1. Malignant neoplasm of upper-outer quadrant of left breast in female, estrogen receptor positive ([address]             Plan:       Monica Jose has had an excellent clinical response to neoadjuvant chemotherapy. We again discussed surgical options, and she wishes to proceed with bilateral mastectomy, left ax sent node biopsy (with dual tracer), and immediate reconstruction for clinical T2N1 left ductal carcinoma. She understands that, if frozen section on the nodes is positive, we will need to proceed with completion left axillary dissection. The indications for the planned procedure, along with the potential benefits and risks which include but are not limited to the risk of anesthesia, bleeding, infection, possible need for additional surgery pending final pathologic assessment, nipple loss, lymphedema, sensation changes, persistent pain, and unappealing cosmetics were reviewed. All questions were answered and she agrees to proceed. Continue dressing changes to port site. I recommend she wear a bra day and night to help decrease the pulling forces on the wound to help expedite healing. Refer to plastic surgery, Dr. Dominik Addison. Patient was seen with total face to face time of 30 minutes. More than 50% of this visit was counseling and education regarding future surgery.               Nataliya Macdonald MD

## 2019-06-12 ENCOUNTER — TELEPHONE (OUTPATIENT)
Dept: SURGERY | Age: 39
End: 2019-06-12

## 2019-06-12 NOTE — TELEPHONE ENCOUNTER
Pt was referred to Dr Navjot Tompkins by Dr Yoli Hickey  Pt is getting a mastectomy at the end of the summer  She will need an appt with Dr Navjot Tompkins to discuss reconstruction  Please advise

## 2019-07-03 ENCOUNTER — OFFICE VISIT (OUTPATIENT)
Dept: SURGERY | Age: 39
End: 2019-07-03
Payer: COMMERCIAL

## 2019-07-03 VITALS
WEIGHT: 215 LBS | SYSTOLIC BLOOD PRESSURE: 125 MMHG | TEMPERATURE: 97.5 F | HEART RATE: 105 BPM | BODY MASS INDEX: 34.7 KG/M2 | DIASTOLIC BLOOD PRESSURE: 87 MMHG

## 2019-07-03 DIAGNOSIS — C50.412 MALIGNANT NEOPLASM OF UPPER-OUTER QUADRANT OF LEFT BREAST IN FEMALE, ESTROGEN RECEPTOR POSITIVE (HCC): Primary | ICD-10-CM

## 2019-07-03 DIAGNOSIS — Z17.0 MALIGNANT NEOPLASM OF UPPER-OUTER QUADRANT OF LEFT BREAST IN FEMALE, ESTROGEN RECEPTOR POSITIVE (HCC): Primary | ICD-10-CM

## 2019-07-03 PROCEDURE — G8417 CALC BMI ABV UP PARAM F/U: HCPCS | Performed by: SURGERY

## 2019-07-03 PROCEDURE — 99202 OFFICE O/P NEW SF 15 MIN: CPT | Performed by: SURGERY

## 2019-07-03 PROCEDURE — 1036F TOBACCO NON-USER: CPT | Performed by: SURGERY

## 2019-07-03 PROCEDURE — G8427 DOCREV CUR MEDS BY ELIG CLIN: HCPCS | Performed by: SURGERY

## 2019-07-05 ENCOUNTER — TELEPHONE (OUTPATIENT)
Dept: SURGERY | Age: 39
End: 2019-07-05

## 2019-07-05 ENCOUNTER — TELEPHONE (OUTPATIENT)
Dept: BREAST CENTER | Age: 39
End: 2019-07-05

## 2019-07-05 NOTE — TELEPHONE ENCOUNTER
YOLANDE for patient to return call - she was seen 7/3/19. Planning on doing surgery  With Dr. Brittanie Clark 7/19/19 @ [address] with a start of 0730 - will await patient to return call.

## 2019-07-08 ENCOUNTER — HOSPITAL ENCOUNTER (OUTPATIENT)
Dept: NON INVASIVE DIAGNOSTICS | Age: 39
Discharge: HOME | End: 2019-07-08
Payer: COMMERCIAL

## 2019-07-08 LAB
LV EF: 53 %
LVEF MODALITY: NORMAL

## 2019-07-08 PROCEDURE — 93306 TTE W/DOPPLER COMPLETE: CPT

## 2019-07-09 ENCOUNTER — OFFICE VISIT (OUTPATIENT)
Dept: SURGERY | Age: 39
End: 2019-07-09
Payer: COMMERCIAL

## 2019-07-09 VITALS
WEIGHT: 213 LBS | BODY MASS INDEX: 34.38 KG/M2 | HEART RATE: 87 BPM | DIASTOLIC BLOOD PRESSURE: 73 MMHG | SYSTOLIC BLOOD PRESSURE: 115 MMHG | OXYGEN SATURATION: 100 %

## 2019-07-09 DIAGNOSIS — Z17.0 MALIGNANT NEOPLASM OF UPPER-OUTER QUADRANT OF LEFT BREAST IN FEMALE, ESTROGEN RECEPTOR POSITIVE (HCC): Primary | ICD-10-CM

## 2019-07-09 DIAGNOSIS — C50.412 MALIGNANT NEOPLASM OF UPPER-OUTER QUADRANT OF LEFT BREAST IN FEMALE, ESTROGEN RECEPTOR POSITIVE (HCC): Primary | ICD-10-CM

## 2019-07-09 PROCEDURE — G8427 DOCREV CUR MEDS BY ELIG CLIN: HCPCS | Performed by: SURGERY

## 2019-07-09 PROCEDURE — 1036F TOBACCO NON-USER: CPT | Performed by: SURGERY

## 2019-07-09 PROCEDURE — G8417 CALC BMI ABV UP PARAM F/U: HCPCS | Performed by: SURGERY

## 2019-07-09 PROCEDURE — 99213 OFFICE O/P EST LOW 20 MIN: CPT | Performed by: SURGERY

## 2019-07-09 NOTE — PROGRESS NOTES
Smoker    Smokeless tobacco: Never Used   Substance and Sexual Activity    Alcohol use: No    Drug use: No    Sexual activity: Not on file   Lifestyle    Physical activity:     Days per week: Not on file     Minutes per session: Not on file    Stress: Not on file   Relationships    Social connections:     Talks on phone: Not on file     Gets together: Not on file     Attends Christianity service: Not on file     Active member of club or organization: Not on file     Attends meetings of clubs or organizations: Not on file     Relationship status: Not on file    Intimate partner violence:     Fear of current or ex partner: Not on file     Emotionally abused: Not on file     Physically abused: Not on file     Forced sexual activity: Not on file   Other Topics Concern    Not on file   Social History Narrative    Not on file       ROS  Constitutional: no weight loss, fever, night sweats   Skin: negative  Cardiovascular: no chest pain or palpitations   Pulmonary: No cough, sputum, or hemoptysis   GI:No abdominal pain  Breast: see above  All other systems were reviewed and are negative    Objective:   Physical Exam    Port wound 10 x 8 mm, shallow, clean. Assessment:       Diagnosis Orders   1. Malignant neoplasm of upper-outer quadrant of left breast in female, estrogen receptor positive ([facility] [address]             Plan:      Reviewed surgical options, including lumpectomy, mastectomy, and mastectomy with reconstruction. She is still unsure about what she wants to do. Will discuss with Dr. Rudy Holly. Patient was seen with total face to face time of 15 minutes. More than 50% of this visit was counseling and education regarding future surgery.       Kristyn Cat MD

## 2019-07-10 ENCOUNTER — TELEPHONE (OUTPATIENT)
Dept: BREAST CENTER | Age: 39
End: 2019-07-10

## 2019-07-10 NOTE — TELEPHONE ENCOUNTER
Discussed surgical options with patient. She wishes to proceed with needle localized partial mastectomy and sentinel node biopsy, possible axillary dissection. The indications for the planned procedure, along with the potential benefits and risks which include but are not limited to the risk of anesthesia, bleeding, infection, possible need for additional surgery pending final pathologic assessment, lymphedema, sensation changes, persistent pain, and unappealing cosmetics were reviewed. All questions were answered and she agrees to proceed.

## 2019-08-01 ENCOUNTER — APPOINTMENT (OUTPATIENT)
Dept: CT IMAGING | Age: 39
End: 2019-08-01
Payer: COMMERCIAL

## 2019-08-01 ENCOUNTER — HOSPITAL ENCOUNTER (EMERGENCY)
Age: 39
Discharge: HOME | End: 2019-08-01
Attending: EMERGENCY MEDICINE
Payer: COMMERCIAL

## 2019-08-01 ENCOUNTER — APPOINTMENT (OUTPATIENT)
Dept: GENERAL RADIOLOGY | Age: 39
End: 2019-08-01
Payer: COMMERCIAL

## 2019-08-01 VITALS
WEIGHT: 212 LBS | DIASTOLIC BLOOD PRESSURE: 79 MMHG | BODY MASS INDEX: 35.32 KG/M2 | RESPIRATION RATE: 17 BRPM | OXYGEN SATURATION: 100 % | HEIGHT: 65 IN | HEART RATE: 85 BPM | SYSTOLIC BLOOD PRESSURE: 114 MMHG | TEMPERATURE: 97.8 F

## 2019-08-01 DIAGNOSIS — M54.6 ACUTE MIDLINE THORACIC BACK PAIN: Primary | ICD-10-CM

## 2019-08-01 LAB
ALBUMIN SERPL-MCNC: 3.9 G/DL (ref 3.4–5)
ANION GAP SERPL CALCULATED.3IONS-SCNC: 14 MMOL/L (ref 3–16)
BACTERIA: ABNORMAL /HPF
BASE EXCESS VENOUS: 1.7 MMOL/L (ref -2–3)
BILIRUBIN URINE: NEGATIVE
BLOOD, URINE: NEGATIVE
BUN BLDV-MCNC: 6 MG/DL (ref 7–20)
C-REACTIVE PROTEIN: 3.4 MG/L (ref 0–5.1)
CALCIUM SERPL-MCNC: 8.8 MG/DL (ref 8.3–10.6)
CARBOXYHEMOGLOBIN: 1.3 % (ref 0–1.5)
CHLORIDE BLD-SCNC: 101 MMOL/L (ref 99–110)
CLARITY: CLEAR
CO2: 24 MMOL/L (ref 21–32)
COLOR: YELLOW
CREAT SERPL-MCNC: <0.5 MG/DL (ref 0.6–1.1)
EPITHELIAL CELLS, UA: ABNORMAL /HPF
GFR AFRICAN AMERICAN: >60
GFR NON-AFRICAN AMERICAN: >60
GLUCOSE BLD-MCNC: 86 MG/DL (ref 70–99)
GLUCOSE URINE: NEGATIVE MG/DL
HCO3 VENOUS: 27.2 MMOL/L (ref 24–28)
HCT VFR BLD CALC: 35.1 % (ref 36–48)
HEMOGLOBIN, VEN, REDUCED: 50.7 %
HEMOGLOBIN: 11 G/DL (ref 12–16)
KETONES, URINE: NEGATIVE MG/DL
LEUKOCYTE ESTERASE, URINE: ABNORMAL
MCH RBC QN AUTO: 25.2 PG (ref 26–34)
MCHC RBC AUTO-ENTMCNC: 31.4 G/DL (ref 31–36)
MCV RBC AUTO: 80.3 FL (ref 80–100)
METHEMOGLOBIN VENOUS: 0.6 % (ref 0–1.5)
MICROSCOPIC EXAMINATION: YES
NITRITE, URINE: NEGATIVE
O2 SAT, VEN: 48 %
PCO2, VEN: 49.3 MMHG (ref 41–51)
PDW BLD-RTO: 12.6 % (ref 12.4–15.4)
PH UA: 8 (ref 5–8)
PH VENOUS: 7.36 (ref 7.35–7.45)
PHOSPHORUS: 4 MG/DL (ref 2.5–4.9)
PLATELET # BLD: 199 K/UL (ref 135–450)
PMV BLD AUTO: 8.2 FL (ref 5–10.5)
PO2, VEN: 29.7 MMHG (ref 25–40)
POTASSIUM SERPL-SCNC: 4.1 MMOL/L (ref 3.5–5.1)
PRO-BNP: 6 PG/ML (ref 0–124)
PROTEIN UA: NEGATIVE MG/DL
RBC # BLD: 4.38 M/UL (ref 4–5.2)
RBC UA: ABNORMAL /HPF (ref 0–2)
SEDIMENTATION RATE, ERYTHROCYTE: 30 MM/HR (ref 0–20)
SODIUM BLD-SCNC: 139 MMOL/L (ref 136–145)
SPECIFIC GRAVITY UA: 1.01 (ref 1–1.03)
TCO2 CALC VENOUS: 29 MMOL/L
TROPONIN: <0.01 NG/ML
TROPONIN: <0.01 NG/ML
URINE REFLEX TO CULTURE: YES
URINE TYPE: ABNORMAL
UROBILINOGEN, URINE: 0.2 E.U./DL
WBC # BLD: 6.5 K/UL (ref 4–11)
WBC UA: ABNORMAL /HPF (ref 0–5)

## 2019-08-01 PROCEDURE — 96374 THER/PROPH/DIAG INJ IV PUSH: CPT

## 2019-08-01 PROCEDURE — 80069 RENAL FUNCTION PANEL: CPT

## 2019-08-01 PROCEDURE — 36415 COLL VENOUS BLD VENIPUNCTURE: CPT

## 2019-08-01 PROCEDURE — 82803 BLOOD GASES ANY COMBINATION: CPT

## 2019-08-01 PROCEDURE — 85652 RBC SED RATE AUTOMATED: CPT

## 2019-08-01 PROCEDURE — 81001 URINALYSIS AUTO W/SCOPE: CPT

## 2019-08-01 PROCEDURE — 87086 URINE CULTURE/COLONY COUNT: CPT

## 2019-08-01 PROCEDURE — 6360000002 HC RX W HCPCS: Performed by: EMERGENCY MEDICINE

## 2019-08-01 PROCEDURE — 6360000004 HC RX CONTRAST MEDICATION: Performed by: EMERGENCY MEDICINE

## 2019-08-01 PROCEDURE — 84484 ASSAY OF TROPONIN QUANT: CPT

## 2019-08-01 PROCEDURE — 93005 ELECTROCARDIOGRAM TRACING: CPT | Performed by: STUDENT IN AN ORGANIZED HEALTH CARE EDUCATION/TRAINING PROGRAM

## 2019-08-01 PROCEDURE — 99284 EMERGENCY DEPT VISIT MOD MDM: CPT

## 2019-08-01 PROCEDURE — 85027 COMPLETE CBC AUTOMATED: CPT

## 2019-08-01 PROCEDURE — 71046 X-RAY EXAM CHEST 2 VIEWS: CPT

## 2019-08-01 PROCEDURE — 86140 C-REACTIVE PROTEIN: CPT

## 2019-08-01 PROCEDURE — 83880 ASSAY OF NATRIURETIC PEPTIDE: CPT

## 2019-08-01 PROCEDURE — 71275 CT ANGIOGRAPHY CHEST: CPT

## 2019-08-01 RX ORDER — KETOROLAC TROMETHAMINE 30 MG/ML
30 INJECTION, SOLUTION INTRAMUSCULAR; INTRAVENOUS ONCE
Status: COMPLETED | OUTPATIENT
Start: 2019-08-01 | End: 2019-08-01

## 2019-08-01 RX ADMIN — KETOROLAC TROMETHAMINE 30 MG: 30 INJECTION, SOLUTION INTRAMUSCULAR at 21:34

## 2019-08-01 RX ADMIN — IOPAMIDOL 80 ML: 755 INJECTION, SOLUTION INTRAVENOUS at 20:55

## 2019-08-01 ASSESSMENT — PAIN DESCRIPTION - ORIENTATION: ORIENTATION: MID

## 2019-08-01 ASSESSMENT — ENCOUNTER SYMPTOMS
EYES NEGATIVE: 1
GASTROINTESTINAL NEGATIVE: 1
RESPIRATORY NEGATIVE: 1

## 2019-08-01 ASSESSMENT — PAIN SCALES - GENERAL
PAINLEVEL_OUTOF10: 6
PAINLEVEL_OUTOF10: 6

## 2019-08-01 ASSESSMENT — PAIN DESCRIPTION - PAIN TYPE: TYPE: ACUTE PAIN

## 2019-08-01 ASSESSMENT — PAIN DESCRIPTION - PROGRESSION: CLINICAL_PROGRESSION: GRADUALLY WORSENING

## 2019-08-01 ASSESSMENT — PAIN DESCRIPTION - LOCATION: LOCATION: ABDOMEN;BACK

## 2019-08-01 ASSESSMENT — PAIN DESCRIPTION - FREQUENCY: FREQUENCY: INTERMITTENT

## 2019-08-01 ASSESSMENT — PAIN DESCRIPTION - DESCRIPTORS: DESCRIPTORS: ACHING

## 2019-08-01 NOTE — ED TRIAGE NOTES
Pt has hx of left breast cancer, oing infusion tx for breast cancer every Friday. PT done with chemo, not doing radiation at this time. PT states mid back pain that radiates to mid abd under breast. Pt scheduled for lumpectomy on 08/16/19. Pt had a day of dysuria and frequency. Pt denies n/v/d, blood in stool/urine. Pt states pain comes and goes, states ibuprofen helped but wanted to make sure she does not have UTI.

## 2019-08-01 NOTE — ED NOTES
smokeless tobacco. She reports that she does not drink alcohol or use drugs. Medications     Previous Medications    No medications on file       Allergies     She has No Known Allergies. Physical Exam     INITIAL VITALS: BP: (!) 136/93, Temp: 97.8 °F (36.6 °C), Pulse: 85, Resp: 17, SpO2: 100 %   Physical Exam   Constitutional: She is oriented to person, place, and time. She appears well-developed and well-nourished. HENT:   Head: Normocephalic and atraumatic. Eyes: Pupils are equal, round, and reactive to light. EOM are normal.   Neck: Normal range of motion. Neck supple. Cardiovascular: Normal rate, regular rhythm, normal heart sounds and intact distal pulses. Pulmonary/Chest: Effort normal and breath sounds normal.   Abdominal: Soft. Bowel sounds are normal.   Musculoskeletal: She exhibits tenderness (Moderate tenderness bilateral anterior lower ribs. ). Neurological: She is alert and oriented to person, place, and time. Skin: Skin is warm and dry. Capillary refill takes less than 2 seconds. Psychiatric: She has a normal mood and affect. Diagnostic Results     EKG   Interpreted inconjunction with emergency department physician No att. providers found  Rhythm: normal sinus   Rate: normal  Axis: normal  Ectopy: none  Conduction: normal  ST Segments: no acute change  T Waves: non specific changes  Q Waves: none  Clinical Impression: no acute changes  Comparison:  None    RADIOLOGY:  CTA PULMONARY W CONTRAST   Final Result      No evidence of pulmonary embolus.       Cholelithiasis      XR CHEST STANDARD (2 VW)   Final Result      No acute pulmonary pathology                      LABS:   Results for orders placed or performed during the hospital encounter of 08/01/19   CBC   Result Value Ref Range    WBC 6.5 4.0 - 11.0 K/uL    RBC 4.38 4.00 - 5.20 M/uL    Hemoglobin 11.0 (L) 12.0 - 16.0 g/dL    Hematocrit 35.1 (L) 36.0 - 48.0 %    MCV 80.3 80.0 - 100.0 fL    MCH 25.2 (L) 26.0 - 34.0 pg    MCHC

## 2019-08-02 ENCOUNTER — TELEPHONE (OUTPATIENT)
Dept: SURGERY | Age: 39
End: 2019-08-02

## 2019-08-02 LAB
EKG ATRIAL RATE: 75 BPM
EKG ATRIAL RATE: 76 BPM
EKG DIAGNOSIS: NORMAL
EKG DIAGNOSIS: NORMAL
EKG P AXIS: 21 DEGREES
EKG P AXIS: 58 DEGREES
EKG P-R INTERVAL: 154 MS
EKG P-R INTERVAL: 162 MS
EKG Q-T INTERVAL: 384 MS
EKG Q-T INTERVAL: 412 MS
EKG QRS DURATION: 84 MS
EKG QRS DURATION: 86 MS
EKG QTC CALCULATION (BAZETT): 432 MS
EKG QTC CALCULATION (BAZETT): 460 MS
EKG R AXIS: 16 DEGREES
EKG R AXIS: 17 DEGREES
EKG T AXIS: 14 DEGREES
EKG T AXIS: 19 DEGREES
EKG VENTRICULAR RATE: 75 BPM
EKG VENTRICULAR RATE: 76 BPM
URINE CULTURE, ROUTINE: NORMAL

## 2019-08-04 LAB
EKG ATRIAL RATE: 75 BPM
EKG ATRIAL RATE: 76 BPM
EKG DIAGNOSIS: NORMAL
EKG DIAGNOSIS: NORMAL
EKG P AXIS: 21 DEGREES
EKG P AXIS: 58 DEGREES
EKG P-R INTERVAL: 154 MS
EKG P-R INTERVAL: 162 MS
EKG Q-T INTERVAL: 384 MS
EKG Q-T INTERVAL: 412 MS
EKG QRS DURATION: 84 MS
EKG QRS DURATION: 86 MS
EKG QTC CALCULATION (BAZETT): 432 MS
EKG QTC CALCULATION (BAZETT): 460 MS
EKG R AXIS: 16 DEGREES
EKG R AXIS: 17 DEGREES
EKG T AXIS: 14 DEGREES
EKG T AXIS: 19 DEGREES
EKG VENTRICULAR RATE: 75 BPM
EKG VENTRICULAR RATE: 76 BPM

## 2019-08-12 DIAGNOSIS — C50.412 MALIGNANT NEOPLASM OF UPPER-OUTER QUADRANT OF LEFT BREAST IN FEMALE, ESTROGEN RECEPTOR POSITIVE (HCC): Primary | ICD-10-CM

## 2019-08-12 DIAGNOSIS — Z17.0 MALIGNANT NEOPLASM OF UPPER-OUTER QUADRANT OF LEFT BREAST IN FEMALE, ESTROGEN RECEPTOR POSITIVE (HCC): Primary | ICD-10-CM

## 2019-08-14 ENCOUNTER — HOSPITAL ENCOUNTER (OUTPATIENT)
Dept: ULTRASOUND IMAGING | Age: 39
Discharge: HOME | End: 2019-08-14
Payer: COMMERCIAL

## 2019-08-14 DIAGNOSIS — C50.412 MALIGNANT NEOPLASM OF UPPER-OUTER QUADRANT OF LEFT BREAST IN FEMALE, ESTROGEN RECEPTOR POSITIVE (HCC): ICD-10-CM

## 2019-08-14 DIAGNOSIS — Z17.0 MALIGNANT NEOPLASM OF UPPER-OUTER QUADRANT OF LEFT BREAST IN FEMALE, ESTROGEN RECEPTOR POSITIVE (HCC): ICD-10-CM

## 2019-08-14 PROCEDURE — 76642 ULTRASOUND BREAST LIMITED: CPT

## 2019-08-15 ENCOUNTER — ANESTHESIA EVENT (OUTPATIENT)
Dept: OPERATING ROOM | Age: 39
End: 2019-08-15
Payer: COMMERCIAL

## 2019-08-16 ENCOUNTER — HOSPITAL ENCOUNTER (OUTPATIENT)
Dept: NUCLEAR MEDICINE | Age: 39
Discharge: HOME | End: 2019-08-16
Payer: COMMERCIAL

## 2019-08-16 ENCOUNTER — HOSPITAL ENCOUNTER (OUTPATIENT)
Age: 39
Setting detail: OUTPATIENT SURGERY
Discharge: HOME | End: 2019-08-16
Attending: SURGERY | Admitting: SURGERY
Payer: COMMERCIAL

## 2019-08-16 ENCOUNTER — APPOINTMENT (OUTPATIENT)
Dept: MAMMOGRAPHY | Age: 39
End: 2019-08-16
Attending: SURGERY
Payer: COMMERCIAL

## 2019-08-16 ENCOUNTER — ANESTHESIA (OUTPATIENT)
Dept: OPERATING ROOM | Age: 39
End: 2019-08-16
Payer: COMMERCIAL

## 2019-08-16 ENCOUNTER — HOSPITAL ENCOUNTER (OUTPATIENT)
Dept: ULTRASOUND IMAGING | Age: 39
Discharge: HOME | End: 2019-08-16
Payer: COMMERCIAL

## 2019-08-16 VITALS
HEIGHT: 65 IN | SYSTOLIC BLOOD PRESSURE: 128 MMHG | RESPIRATION RATE: 16 BRPM | OXYGEN SATURATION: 95 % | DIASTOLIC BLOOD PRESSURE: 87 MMHG | WEIGHT: 215 LBS | HEART RATE: 81 BPM | TEMPERATURE: 97.7 F | BODY MASS INDEX: 35.82 KG/M2

## 2019-08-16 VITALS — DIASTOLIC BLOOD PRESSURE: 80 MMHG | TEMPERATURE: 97.3 F | SYSTOLIC BLOOD PRESSURE: 122 MMHG | OXYGEN SATURATION: 91 %

## 2019-08-16 DIAGNOSIS — C50.411 MALIGNANT NEOPLASM OF UPPER-OUTER QUADRANT OF RIGHT BREAST IN FEMALE, ESTROGEN RECEPTOR POSITIVE (HCC): Primary | ICD-10-CM

## 2019-08-16 DIAGNOSIS — C50.912 MALIGNANT NEOPLASM OF LEFT FEMALE BREAST, UNSPECIFIED ESTROGEN RECEPTOR STATUS, UNSPECIFIED SITE OF BREAST (HCC): ICD-10-CM

## 2019-08-16 DIAGNOSIS — Z17.0 MALIGNANT NEOPLASM OF UPPER-OUTER QUADRANT OF RIGHT BREAST IN FEMALE, ESTROGEN RECEPTOR POSITIVE (HCC): Primary | ICD-10-CM

## 2019-08-16 DIAGNOSIS — R92.8 ABNORMAL MAMMOGRAM: ICD-10-CM

## 2019-08-16 LAB — PREGNANCY, URINE: NEGATIVE

## 2019-08-16 PROCEDURE — 2709999900 HC NON-CHARGEABLE SUPPLY: Performed by: SURGERY

## 2019-08-16 PROCEDURE — 2709999900 US PLACE BREAST LOC DEVICE 1ST LESION LEFT

## 2019-08-16 PROCEDURE — A4648 IMPLANTABLE TISSUE MARKER: HCPCS | Performed by: SURGERY

## 2019-08-16 PROCEDURE — 2580000003 HC RX 258: Performed by: ANESTHESIOLOGY

## 2019-08-16 PROCEDURE — 7100000000 HC PACU RECOVERY - FIRST 15 MIN: Performed by: SURGERY

## 2019-08-16 PROCEDURE — 6360000002 HC RX W HCPCS: Performed by: ANESTHESIOLOGY

## 2019-08-16 PROCEDURE — 38525 BIOPSY/REMOVAL LYMPH NODES: CPT | Performed by: SURGERY

## 2019-08-16 PROCEDURE — 19301 PARTIAL MASTECTOMY: CPT | Performed by: SURGERY

## 2019-08-16 PROCEDURE — 2500000003 HC RX 250 WO HCPCS: Performed by: NURSE ANESTHETIST, CERTIFIED REGISTERED

## 2019-08-16 PROCEDURE — 6360000002 HC RX W HCPCS: Performed by: SURGERY

## 2019-08-16 PROCEDURE — 88360 TUMOR IMMUNOHISTOCHEM/MANUAL: CPT

## 2019-08-16 PROCEDURE — 84703 CHORIONIC GONADOTROPIN ASSAY: CPT

## 2019-08-16 PROCEDURE — 2500000003 HC RX 250 WO HCPCS: Performed by: SURGERY

## 2019-08-16 PROCEDURE — 2580000003 HC RX 258: Performed by: SURGERY

## 2019-08-16 PROCEDURE — 19294 PREPJ TUM CAV IORT PRTL MAST: CPT | Performed by: SURGERY

## 2019-08-16 PROCEDURE — 88331 PATH CONSLTJ SURG 1 BLK 1SPC: CPT

## 2019-08-16 PROCEDURE — 88307 TISSUE EXAM BY PATHOLOGIST: CPT

## 2019-08-16 PROCEDURE — 76098 X-RAY EXAM SURGICAL SPECIMEN: CPT

## 2019-08-16 PROCEDURE — 38792 RA TRACER ID OF SENTINL NODE: CPT

## 2019-08-16 PROCEDURE — 6370000000 HC RX 637 (ALT 250 FOR IP): Performed by: SURGERY

## 2019-08-16 PROCEDURE — 2720000010 HC SURG SUPPLY STERILE: Performed by: SURGERY

## 2019-08-16 PROCEDURE — 14001 TIS TRNFR TRUNK 10.1-30SQCM: CPT | Performed by: SURGERY

## 2019-08-16 PROCEDURE — 3700000000 HC ANESTHESIA ATTENDED CARE: Performed by: SURGERY

## 2019-08-16 PROCEDURE — 3430000000 HC RX DIAGNOSTIC RADIOPHARMACEUTICAL: Performed by: SURGERY

## 2019-08-16 PROCEDURE — 7100000010 HC PHASE II RECOVERY - FIRST 15 MIN: Performed by: SURGERY

## 2019-08-16 PROCEDURE — 7100000001 HC PACU RECOVERY - ADDTL 15 MIN: Performed by: SURGERY

## 2019-08-16 PROCEDURE — 3600000014 HC SURGERY LEVEL 4 ADDTL 15MIN: Performed by: SURGERY

## 2019-08-16 PROCEDURE — A9541 TC99M SULFUR COLLOID: HCPCS | Performed by: SURGERY

## 2019-08-16 PROCEDURE — 88342 IMHCHEM/IMCYTCHM 1ST ANTB: CPT

## 2019-08-16 PROCEDURE — 7100000011 HC PHASE II RECOVERY - ADDTL 15 MIN: Performed by: SURGERY

## 2019-08-16 PROCEDURE — 88305 TISSUE EXAM BY PATHOLOGIST: CPT

## 2019-08-16 PROCEDURE — 77065 DX MAMMO INCL CAD UNI: CPT

## 2019-08-16 PROCEDURE — 3700000001 HC ADD 15 MINUTES (ANESTHESIA): Performed by: SURGERY

## 2019-08-16 PROCEDURE — 6360000002 HC RX W HCPCS: Performed by: NURSE ANESTHETIST, CERTIFIED REGISTERED

## 2019-08-16 PROCEDURE — 38900 IO MAP OF SENT LYMPH NODE: CPT | Performed by: SURGERY

## 2019-08-16 PROCEDURE — 3600000004 HC SURGERY LEVEL 4 BASE: Performed by: SURGERY

## 2019-08-16 DEVICE — THE MARKER IS A RADIOGRAPHIC IMPLANTABLE MARKER USED TO MARK SOFT TISSUE.IT IS COMPRISED OF A BIOABSORBABLE SPACER THAT HOLDS RADIOPAQUE MARKER CLIPS.
Type: IMPLANTABLE DEVICE | Site: BREAST | Status: FUNCTIONAL
Brand: BIOZORB LP MARKER

## 2019-08-16 RX ORDER — BUPIVACAINE HYDROCHLORIDE 5 MG/ML
INJECTION, SOLUTION EPIDURAL; INTRACAUDAL PRN
Status: DISCONTINUED | OUTPATIENT
Start: 2019-08-16 | End: 2019-08-16 | Stop reason: ALTCHOICE

## 2019-08-16 RX ORDER — DEXAMETHASONE SODIUM PHOSPHATE 4 MG/ML
INJECTION, SOLUTION INTRA-ARTICULAR; INTRALESIONAL; INTRAMUSCULAR; INTRAVENOUS; SOFT TISSUE PRN
Status: DISCONTINUED | OUTPATIENT
Start: 2019-08-16 | End: 2019-08-16 | Stop reason: SDUPTHER

## 2019-08-16 RX ORDER — ONDANSETRON 2 MG/ML
4 INJECTION INTRAMUSCULAR; INTRAVENOUS
Status: COMPLETED | OUTPATIENT
Start: 2019-08-16 | End: 2019-08-16

## 2019-08-16 RX ORDER — OXYCODONE HYDROCHLORIDE AND ACETAMINOPHEN 5; 325 MG/1; MG/1
1 TABLET ORAL EVERY 6 HOURS PRN
Qty: 10 TABLET | Refills: 0 | Status: SHIPPED | OUTPATIENT
Start: 2019-08-16 | End: 2019-08-19

## 2019-08-16 RX ORDER — GLYCOPYRROLATE 1 MG/5 ML
SYRINGE (ML) INTRAVENOUS PRN
Status: DISCONTINUED | OUTPATIENT
Start: 2019-08-16 | End: 2019-08-16 | Stop reason: SDUPTHER

## 2019-08-16 RX ORDER — HYDROMORPHONE HCL 110MG/55ML
PATIENT CONTROLLED ANALGESIA SYRINGE INTRAVENOUS PRN
Status: DISCONTINUED | OUTPATIENT
Start: 2019-08-16 | End: 2019-08-16 | Stop reason: SDUPTHER

## 2019-08-16 RX ORDER — PROPOFOL 10 MG/ML
INJECTION, EMULSION INTRAVENOUS PRN
Status: DISCONTINUED | OUTPATIENT
Start: 2019-08-16 | End: 2019-08-16 | Stop reason: SDUPTHER

## 2019-08-16 RX ORDER — PROMETHAZINE HYDROCHLORIDE 25 MG/ML
6.25 INJECTION, SOLUTION INTRAMUSCULAR; INTRAVENOUS
Status: COMPLETED | OUTPATIENT
Start: 2019-08-16 | End: 2019-08-16

## 2019-08-16 RX ORDER — ONDANSETRON 2 MG/ML
INJECTION INTRAMUSCULAR; INTRAVENOUS PRN
Status: DISCONTINUED | OUTPATIENT
Start: 2019-08-16 | End: 2019-08-16 | Stop reason: SDUPTHER

## 2019-08-16 RX ORDER — KETOROLAC TROMETHAMINE 30 MG/ML
INJECTION, SOLUTION INTRAMUSCULAR; INTRAVENOUS PRN
Status: DISCONTINUED | OUTPATIENT
Start: 2019-08-16 | End: 2019-08-16 | Stop reason: SDUPTHER

## 2019-08-16 RX ORDER — FENTANYL CITRATE 50 UG/ML
25 INJECTION, SOLUTION INTRAMUSCULAR; INTRAVENOUS EVERY 5 MIN PRN
Status: DISCONTINUED | OUTPATIENT
Start: 2019-08-16 | End: 2019-08-16 | Stop reason: HOSPADM

## 2019-08-16 RX ORDER — MAGNESIUM HYDROXIDE 1200 MG/15ML
LIQUID ORAL CONTINUOUS PRN
Status: COMPLETED | OUTPATIENT
Start: 2019-08-16 | End: 2019-08-16

## 2019-08-16 RX ORDER — ISOSULFAN BLUE 50 MG/5ML
INJECTION, SOLUTION SUBCUTANEOUS PRN
Status: DISCONTINUED | OUTPATIENT
Start: 2019-08-16 | End: 2019-08-16 | Stop reason: ALTCHOICE

## 2019-08-16 RX ORDER — SODIUM CHLORIDE, SODIUM LACTATE, POTASSIUM CHLORIDE, CALCIUM CHLORIDE 600; 310; 30; 20 MG/100ML; MG/100ML; MG/100ML; MG/100ML
INJECTION, SOLUTION INTRAVENOUS CONTINUOUS
Status: DISCONTINUED | OUTPATIENT
Start: 2019-08-16 | End: 2019-08-16 | Stop reason: HOSPADM

## 2019-08-16 RX ORDER — FENTANYL CITRATE 50 UG/ML
50 INJECTION, SOLUTION INTRAMUSCULAR; INTRAVENOUS EVERY 5 MIN PRN
Status: DISCONTINUED | OUTPATIENT
Start: 2019-08-16 | End: 2019-08-16 | Stop reason: HOSPADM

## 2019-08-16 RX ORDER — NEOSTIGMINE METHYLSULFATE 5 MG/5 ML
SYRINGE (ML) INTRAVENOUS PRN
Status: DISCONTINUED | OUTPATIENT
Start: 2019-08-16 | End: 2019-08-16 | Stop reason: SDUPTHER

## 2019-08-16 RX ORDER — ROCURONIUM BROMIDE 10 MG/ML
INJECTION, SOLUTION INTRAVENOUS PRN
Status: DISCONTINUED | OUTPATIENT
Start: 2019-08-16 | End: 2019-08-16 | Stop reason: SDUPTHER

## 2019-08-16 RX ORDER — LIDOCAINE HYDROCHLORIDE 20 MG/ML
INJECTION, SOLUTION INTRAVENOUS PRN
Status: DISCONTINUED | OUTPATIENT
Start: 2019-08-16 | End: 2019-08-16 | Stop reason: SDUPTHER

## 2019-08-16 RX ORDER — ACETAMINOPHEN 325 MG/1
650 TABLET ORAL
Status: COMPLETED | OUTPATIENT
Start: 2019-08-16 | End: 2019-08-16

## 2019-08-16 RX ADMIN — DEXAMETHASONE SODIUM PHOSPHATE 8 MG: 4 INJECTION, SOLUTION INTRAMUSCULAR; INTRAVENOUS at 08:41

## 2019-08-16 RX ADMIN — ONDANSETRON 4 MG: 2 INJECTION INTRAMUSCULAR; INTRAVENOUS at 08:33

## 2019-08-16 RX ADMIN — HYDROMORPHONE HYDROCHLORIDE 1 MG: 2 INJECTION, SOLUTION INTRAMUSCULAR; INTRAVENOUS; SUBCUTANEOUS at 08:46

## 2019-08-16 RX ADMIN — FAMOTIDINE 20 MG: 10 INJECTION, SOLUTION INTRAVENOUS at 08:33

## 2019-08-16 RX ADMIN — LIDOCAINE HYDROCHLORIDE 100 MG: 20 INJECTION, SOLUTION INTRAVENOUS at 08:36

## 2019-08-16 RX ADMIN — HYDROMORPHONE HYDROCHLORIDE 0.6 MG: 2 INJECTION, SOLUTION INTRAMUSCULAR; INTRAVENOUS; SUBCUTANEOUS at 08:59

## 2019-08-16 RX ADMIN — SODIUM CHLORIDE, SODIUM LACTATE, POTASSIUM CHLORIDE, AND CALCIUM CHLORIDE: 600; 310; 30; 20 INJECTION, SOLUTION INTRAVENOUS at 06:44

## 2019-08-16 RX ADMIN — ROCURONIUM BROMIDE 50 MG: 10 INJECTION, SOLUTION INTRAVENOUS at 08:36

## 2019-08-16 RX ADMIN — ONDANSETRON 4 MG: 2 INJECTION INTRAMUSCULAR; INTRAVENOUS at 10:55

## 2019-08-16 RX ADMIN — PROPOFOL 100 MG: 10 INJECTION, EMULSION INTRAVENOUS at 08:40

## 2019-08-16 RX ADMIN — Medication 0.8 MG: at 10:26

## 2019-08-16 RX ADMIN — KETOROLAC TROMETHAMINE 30 MG: 30 INJECTION, SOLUTION INTRAMUSCULAR; INTRAVENOUS at 08:53

## 2019-08-16 RX ADMIN — ACETAMINOPHEN 650 MG: 325 TABLET ORAL at 06:33

## 2019-08-16 RX ADMIN — PROPOFOL 50 MG: 10 INJECTION, EMULSION INTRAVENOUS at 08:38

## 2019-08-16 RX ADMIN — Medication 2 G: at 08:45

## 2019-08-16 RX ADMIN — SODIUM CHLORIDE, SODIUM LACTATE, POTASSIUM CHLORIDE, AND CALCIUM CHLORIDE: 600; 310; 30; 20 INJECTION, SOLUTION INTRAVENOUS at 09:27

## 2019-08-16 RX ADMIN — PROPOFOL 50 MG: 10 INJECTION, EMULSION INTRAVENOUS at 10:28

## 2019-08-16 RX ADMIN — PROPOFOL 250 MG: 10 INJECTION, EMULSION INTRAVENOUS at 08:36

## 2019-08-16 RX ADMIN — PROMETHAZINE HYDROCHLORIDE 6.25 MG: 25 INJECTION INTRAMUSCULAR; INTRAVENOUS at 11:13

## 2019-08-16 RX ADMIN — Medication 5 MG: at 10:26

## 2019-08-16 RX ADMIN — Medication 800 MICRO CURIE: at 08:40

## 2019-08-16 ASSESSMENT — PULMONARY FUNCTION TESTS
PIF_VALUE: 25
PIF_VALUE: 27
PIF_VALUE: 25
PIF_VALUE: 25
PIF_VALUE: 24
PIF_VALUE: 25
PIF_VALUE: 26
PIF_VALUE: 26
PIF_VALUE: 25
PIF_VALUE: 24
PIF_VALUE: 26
PIF_VALUE: 26
PIF_VALUE: 24
PIF_VALUE: 25
PIF_VALUE: 25
PIF_VALUE: 24
PIF_VALUE: 24
PIF_VALUE: 25
PIF_VALUE: 0
PIF_VALUE: 24
PIF_VALUE: 27
PIF_VALUE: 25
PIF_VALUE: 26
PIF_VALUE: 25
PIF_VALUE: 28
PIF_VALUE: 25
PIF_VALUE: 25
PIF_VALUE: 24
PIF_VALUE: 27
PIF_VALUE: 1
PIF_VALUE: 25
PIF_VALUE: 26
PIF_VALUE: 1
PIF_VALUE: 1
PIF_VALUE: 25
PIF_VALUE: 27
PIF_VALUE: 26
PIF_VALUE: 25
PIF_VALUE: 27
PIF_VALUE: 26
PIF_VALUE: 25
PIF_VALUE: 26
PIF_VALUE: 24
PIF_VALUE: 27
PIF_VALUE: 24
PIF_VALUE: 26
PIF_VALUE: 27
PIF_VALUE: 25
PIF_VALUE: 25
PIF_VALUE: 26
PIF_VALUE: 26
PIF_VALUE: 2
PIF_VALUE: 25
PIF_VALUE: 26
PIF_VALUE: 25
PIF_VALUE: 26
PIF_VALUE: 26
PIF_VALUE: 25
PIF_VALUE: 26
PIF_VALUE: 25
PIF_VALUE: 26
PIF_VALUE: 24
PIF_VALUE: 25
PIF_VALUE: 19
PIF_VALUE: 25
PIF_VALUE: 25
PIF_VALUE: 26
PIF_VALUE: 25
PIF_VALUE: 25
PIF_VALUE: 23
PIF_VALUE: 0
PIF_VALUE: 28
PIF_VALUE: 24
PIF_VALUE: 25
PIF_VALUE: 15
PIF_VALUE: 30
PIF_VALUE: 27
PIF_VALUE: 23
PIF_VALUE: 25
PIF_VALUE: 26
PIF_VALUE: 17
PIF_VALUE: 25
PIF_VALUE: 26
PIF_VALUE: 27
PIF_VALUE: 25
PIF_VALUE: 1
PIF_VALUE: 25
PIF_VALUE: 26
PIF_VALUE: 26
PIF_VALUE: 25
PIF_VALUE: 26
PIF_VALUE: 25
PIF_VALUE: 26
PIF_VALUE: 25
PIF_VALUE: 26
PIF_VALUE: 28
PIF_VALUE: 24
PIF_VALUE: 9
PIF_VALUE: 26
PIF_VALUE: 25
PIF_VALUE: 1
PIF_VALUE: 1
PIF_VALUE: 25
PIF_VALUE: 24
PIF_VALUE: 25
PIF_VALUE: 26
PIF_VALUE: 24
PIF_VALUE: 24
PIF_VALUE: 26

## 2019-08-16 ASSESSMENT — PAIN SCALES - GENERAL
PAINLEVEL_OUTOF10: 0

## 2019-08-16 ASSESSMENT — PAIN - FUNCTIONAL ASSESSMENT: PAIN_FUNCTIONAL_ASSESSMENT: 0-10

## 2019-08-16 NOTE — H&P
[place]    [phone]    [facility] Same Day Surgery Update H & P  Department of General Surgery   Surgical Service   Pre-operative History and Physical  Last H & P within the last 30 days. DIAGNOSIS:   amalignant neoplasm of upper quadrant left breast, estrogen receptor positive    PROCEDURE:  VA MASTECTOMY, PARTIAL [08609] (MAMMOGRAM GUIDED NEEDLE LOCALIZED LEFT BREAST PARTIAL MASTECTOMY)  VA BX/REMV,LYMPH NODE,DEEP AXILL [34100] (WITH LEFT AXILLARY SENTINEL NODE BIOSPY WITH RADIOSOTOPE AND GAMMA PROBE WITH POSSIBLE AXILLARY DISSECTION AND BIOZORB MARKER INSERTION)     HISTORY OF PRESENT ILLNESS:    Patient with screening mammogram detected left breast cancer presents today for the above procedures. Biopsy demonstrated ductal carcinoma. Patient has completed neoadjuvant chemotherapy and presents today for the above procedures.       Past Medical History:        Diagnosis Date    Breast cancer ([address] 01/2019    Left breast    Sickle cell trait [facility])      Past Surgical History:        Procedure Laterality Date    CATHETER REMOVAL Right 4/1/2019    PORT REMOVAL performed by Anette Triana MD at [address] [facility]      X 1    INSERTION / REMOVAL / REPLACEMENT VENOUS ACCESS CATHETER N/A 2/7/2019    PORT-A-CATHETER PLACEMENT FOR CHEMOTHERAPY ACCISS/CHEMO TO START 2/8/19 WITH C-ARM performed by Ric Weston MD at  [facility] [address] W/ SUBCUTANEOUS PORT Right 02/07/2019     Past Social History:  Social History     Socioeconomic History    Marital status: Unknown     Spouse name: Not on file    Number of children: Not on file    Years of education: Not on file    Highest education level: Not on file   Occupational History    Not on file   Social Needs    Financial resource strain: Not on file    Food insecurity:     Worry: Not on file     Inability: Not on file    Transportation needs:     Medical: Not on file     Non-medical: Not on file   Tobacco Use

## 2019-08-16 NOTE — OP NOTE
Operative Report      Name:  Loreta Jaime   MRN:  [id number]  Date:  8/16/2019        PREOPERATIVE DIAGNOSIS: left breast cancer. POSTOPERATIVE DIAGNOSIS: same    PROCEDURE:needle localized left partial mastectomy, left axillary sentinel lymph node biopsy. SURGEON: Christelle    ESTIMATED BLOOD LOSS:  Less than 25 mL    ANESTHESIA: General.    INDICATIONS FOR PROCEDURE: The patient is a 35 y.o. female who had  presented with breast cancer, and underwent neoadjuvant chemotherapy. She is here now for left partial mastectomy and axillary sentinel lymph node biopsy for clinical stage T2N1 ductal carcinoma. The risks, benefits and alternatives were discussed with the  patient. Questions were answered and she is agreeable to proceed. DESCRIPTION OF OPERATION: The patient was brought to the operating room and  placed on the OR table in the supine position. General anesthesia was  obtained and the left breast and axillary region were prepped and draped in  the usual sterile fashion. The patient had radioisotope injection preoperatively per nuclear medicine for identification of sentinel node. Patient also went to radiology for needle localization. I instilled 5 cc of isosulfan blue into the subareolar space, and this was massaged towards the axillary region. An incision was made in the upper portion on the left breast and carried down through the skin and subcutaneous tissues. The wire was brought into the wound, and the tissue around the wire was dissected free from the surrounding breast tissue. The specimen was marked with a short suture superiorly and long suture laterally, and this was sent to radiology, which confirmed removal of the area in question, and then sent to pathology for permanent section. I took additional tissue from all 6 margins, and this was marked with ink on the new true margins and sent to pathology.     There was a fairly large surgical defect caused by  the removal of the

## 2019-08-16 NOTE — ANESTHESIA POSTPROCEDURE EVALUATION
Department of Anesthesiology  Postprocedure Note    Patient: Dany Jonas  MRN: [id number]  YOB: 1980  Date of evaluation: 8/16/2019  Time:  1:26 PM     Procedure Summary     Date:  08/16/19 Room / Location:  [facility] [address] OR    Anesthesia Start:  0825 Anesthesia Stop:  6777    Procedures:       MAMMOGRAM GUIDED NEEDLE LOCALIZED LEFT BREAST PARTIAL MASTECTOMY (Left )      WITH LEFT AXILLARY SENTINEL NODE BIOSPY WITH RADIOSOTOPE AND GAMMA PROBE WITH POSSIBLE AXILLARY DISSECTION AND BIOZORB MARKER INSERTION (Left ) Diagnosis:  (amalignant neoplasm of upper quadrant left breast, estrogen receptor positive)    Surgeon:  Selena Mario MD Responsible Provider:  Berhane Stevens MD    Anesthesia Type:  general ASA Status:  3          Anesthesia Type: general    Ernie Phase I: Ernie Score: 10    Ernie Phase II: Ernie Score: 10    Last vitals: Reviewed and per EMR flowsheets.        Anesthesia Post Evaluation    Patient location during evaluation: PACU  Patient participation: complete - patient participated  Level of consciousness: awake and alert  Pain score: 0  Airway patency: patent  Nausea & Vomiting: no nausea and no vomiting  Complications: no  Cardiovascular status: blood pressure returned to baseline  Respiratory status: acceptable  Hydration status: stable

## 2019-08-16 NOTE — PROGRESS NOTES
Ambulatory Surgery/Procedure Discharge Note    Vitals:    08/16/19 1236   BP: 128/87   Pulse: 81   Resp: 16   Temp: 97.7 °F (36.5 °C)   SpO2: 95%       In: 1711 [P.O.:50; I.V.:1661]  Out: -     Restroom use offered before discharge. Yes, void per bathroom    Pain assessment:  Left breast incision well approximated with drainage on fluffy guaze under bra but no current drainage. Left axillary incision clean dry and well approximated. Denies pain. Pain Level: 0    Nauseated on arrival to [facility] but pt had several medications already. Pt stated very tired. After slept for 30min, pt states nausea resolved and tolerating ice chips and crackers well. Patient discharged to home/self care.  Patient discharged via wheel chair by transporter to waiting family/S.O.       8/16/2019 1:50 PM
PACU Transfer to \[facility]\""    Vitals:    08/16/19 1200   BP: 111/71   Pulse: 75   Resp: 27   Temp: 96.8 °F (36 °C)   SpO2: 93%         Intake/Output Summary (Last 24 hours) at 8/16/2019 1214  Last data filed at 8/16/2019 1214  Gross per 24 hour   Intake 1711 ml   Output --   Net 1711 ml       Pain assessment:  none  Pain Level: 0    Patient transferred to care of \[facility]\"" RN.    8/16/2019 12:14 PM
protect them while you are in surgery. 16. If you use a CPAP, please bring it with you on the day of your procedure. 17. Do not shave or wax for 72 hours prior to procedure near your operative site  18. FOR WOMAN OF CHILDBEARING AGE ONLY- please bring a urine sample with you on day of surgery or make sure we can collect on arrival.    If you have further questions, you may contact your surgeon's office or us at [phone]    Left instructions on patient's voicemail. Rossana Patterson. 8/15/2019 .1:02 PM

## 2019-08-30 ENCOUNTER — OFFICE VISIT (OUTPATIENT)
Dept: SURGERY | Age: 39
End: 2019-08-30

## 2019-08-30 VITALS
WEIGHT: 213 LBS | BODY MASS INDEX: 35.45 KG/M2 | SYSTOLIC BLOOD PRESSURE: 117 MMHG | HEART RATE: 88 BPM | DIASTOLIC BLOOD PRESSURE: 79 MMHG

## 2019-08-30 DIAGNOSIS — C50.412 MALIGNANT NEOPLASM OF UPPER-OUTER QUADRANT OF LEFT BREAST IN FEMALE, ESTROGEN RECEPTOR POSITIVE (HCC): Primary | ICD-10-CM

## 2019-08-30 DIAGNOSIS — Z90.12 S/P PARTIAL MASTECTOMY, LEFT: ICD-10-CM

## 2019-08-30 DIAGNOSIS — Z17.0 MALIGNANT NEOPLASM OF UPPER-OUTER QUADRANT OF LEFT BREAST IN FEMALE, ESTROGEN RECEPTOR POSITIVE (HCC): Primary | ICD-10-CM

## 2019-08-30 PROCEDURE — 99024 POSTOP FOLLOW-UP VISIT: CPT | Performed by: SURGERY

## 2019-08-30 NOTE — PATIENT INSTRUCTIONS
Return to office in 5 months with bilateral breast diagnostic mammogram  Continue with monthly self breast checks  Phone number given to patient for Dr Michael Mays

## 2019-12-23 ENCOUNTER — HOSPITAL ENCOUNTER (OUTPATIENT)
Dept: CT IMAGING | Age: 39
Discharge: HOME | End: 2019-12-23
Payer: COMMERCIAL

## 2019-12-23 DIAGNOSIS — C50.412 MALIGNANT NEOPLASM OF UPPER-OUTER QUADRANT OF LEFT BREAST IN FEMALE, ESTROGEN RECEPTOR POSITIVE (HCC): ICD-10-CM

## 2019-12-23 DIAGNOSIS — Z17.0 MALIGNANT NEOPLASM OF UPPER-OUTER QUADRANT OF LEFT BREAST IN FEMALE, ESTROGEN RECEPTOR POSITIVE (HCC): ICD-10-CM

## 2019-12-23 PROCEDURE — 71250 CT THORAX DX C-: CPT

## 2019-12-31 ENCOUNTER — TELEPHONE (OUTPATIENT)
Dept: INTERNAL MEDICINE CLINIC | Age: 39
End: 2019-12-31

## 2019-12-31 NOTE — TELEPHONE ENCOUNTER
LVM for patient to call back to schedule NP appointment. We received a referral via fax from [facility] (Dr. Ryan Munoz) for patient to establish Primary Care with our office. Can scheduled with Dr. Aline Damon.

## 2020-02-07 ENCOUNTER — OFFICE VISIT (OUTPATIENT)
Dept: SURGERY | Age: 40
End: 2020-02-07
Payer: COMMERCIAL

## 2020-02-07 ENCOUNTER — HOSPITAL ENCOUNTER (OUTPATIENT)
Dept: MAMMOGRAPHY | Age: 40
Discharge: HOME | End: 2020-02-07
Payer: COMMERCIAL

## 2020-02-07 VITALS — DIASTOLIC BLOOD PRESSURE: 91 MMHG | HEART RATE: 102 BPM | SYSTOLIC BLOOD PRESSURE: 128 MMHG

## 2020-02-07 PROCEDURE — 99213 OFFICE O/P EST LOW 20 MIN: CPT | Performed by: SURGERY

## 2020-02-07 PROCEDURE — G0279 TOMOSYNTHESIS, MAMMO: HCPCS

## 2020-02-07 PROCEDURE — 1036F TOBACCO NON-USER: CPT | Performed by: SURGERY

## 2020-02-07 PROCEDURE — G8484 FLU IMMUNIZE NO ADMIN: HCPCS | Performed by: SURGERY

## 2020-02-07 PROCEDURE — G8417 CALC BMI ABV UP PARAM F/U: HCPCS | Performed by: SURGERY

## 2020-02-07 PROCEDURE — G8427 DOCREV CUR MEDS BY ELIG CLIN: HCPCS | Performed by: SURGERY

## 2020-02-07 RX ORDER — LETROZOLE 2.5 MG/1
2.5 TABLET, FILM COATED ORAL DAILY
COMMUNITY

## 2020-02-07 NOTE — PROGRESS NOTES
Subjective:      Patient ID: Tank Dubois is a 35 y.o. female. HPI   Chief Complaint   Patient presents with    Follow-up     Patient presents 5 month breast check with bilateral mammogram     Patient is s/p left lumpectomy/snbx/Biozorb insertion 8/16/2019 post neoadjuvant chemotherapy. 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 T2N1 ->T1bN0. ER/MT positive, Her2 positive. Postop radiation, on letrozole and Kadcyla. Receiving Lupron injections. She has not felt any masses, no breast pain or nipple discharge. Bilateral mammogram today BIRADS 2. Past Medical History:   Diagnosis Date    Breast cancer ([facility] [address] 01/2019    Left breast    Sickle cell trait ([facility] [address]        Past Surgical History:   Procedure Laterality Date    BREAST LUMPECTOMY Left 8/16/2019    MAMMOGRAM GUIDED NEEDLE LOCALIZED LEFT BREAST PARTIAL MASTECTOMY performed by Aston Way MD at [address] Right 4/1/2019    PORT REMOVAL performed by Arelis Dorman MD at [address] [place] [address]      X 1    INSERTION / REMOVAL / REPLACEMENT VENOUS ACCESS CATHETER N/A 2/7/2019    PORT-A-CATHETER PLACEMENT FOR CHEMOTHERAPY ACCISS/CHEMO TO START 2/8/19 WITH C-ARM performed by Aston Way MD at [address] [place] [address] lymph node biopsy Left 8/16/2019    WITH LEFT AXILLARY SENTINEL NODE BIOSPY WITH RADIOSOTOPE AND GAMMA PROBE WITH POSSIBLE AXILLARY DISSECTION AND BIOZORB MARKER INSERTION performed by Aston Way MD at [address] [facility] W/ SUBCUTANEOUS PORT Right 02/07/2019       Current Outpatient Medications   Medication Sig Dispense Refill    letrozole (FEMARA) 2.5 MG tablet Take 2.5 mg by mouth daily       No current facility-administered medications for this visit.         Social History     Socioeconomic History    Marital status: Unknown     Spouse name: Not on file    Number of children: Not on file    Years of education: Not on file    Highest education level: Rate and Rhythm: Normal rate. Pulmonary:      Effort: Pulmonary effort is normal. No respiratory distress. Breath sounds: No wheezing. Abdominal:      General: There is no distension. Palpations: Abdomen is soft. Musculoskeletal: Normal range of motion. General: No tenderness. Lymphadenopathy:      Cervical: No cervical adenopathy. Upper Body:      Right upper body: No axillary adenopathy. Left upper body: No axillary adenopathy. Skin:     General: Skin is warm and dry. Coloration: Skin is not pale. Findings: No erythema or rash. Neurological:      Mental Status: She is alert and oriented to person, place, and time. Cranial Nerves: No cranial nerve deficit. Coordination: Coordination normal.   Psychiatric:         Behavior: Behavior normal.         Thought Content: Thought content normal.         Judgment: Judgment normal.     left breast - well healed lumpectomy scar, no masses, no nipple or skin changes  Right breast - no masses, no nipple or skin changes    Assessment:       Diagnosis Orders   1. Malignant neoplasm of upper-outer quadrant of left breast in female, estrogen receptor positive ([facility] [address]     2. S/P partial mastectomy, left             Plan:      Mammogram was reviewed. At the present time, there is no concern for breast cancer recurrence. Lifestyle modification was reviewed with the patient, including the importance of regular exercise, adequate sleep, weight management, decreased consumption of red meat, limiting alcohol intake, increasing fresh fruits and vegetables.   Continue monthly self breast exam, f/u with me in 1 year with mammogram.             Wilfredo Cartagena MD

## 2020-06-12 ENCOUNTER — HOSPITAL ENCOUNTER (OUTPATIENT)
Dept: CT IMAGING | Age: 40
Discharge: HOME | End: 2020-06-12
Payer: COMMERCIAL

## 2020-06-12 PROCEDURE — 6360000004 HC RX CONTRAST MEDICATION: Performed by: INTERNAL MEDICINE

## 2020-06-12 PROCEDURE — 74177 CT ABD & PELVIS W/CONTRAST: CPT

## 2020-06-12 RX ADMIN — IOHEXOL 50 ML: 240 INJECTION, SOLUTION INTRATHECAL; INTRAVASCULAR; INTRAVENOUS; ORAL at 11:42

## 2020-06-12 RX ADMIN — IOPAMIDOL 80 ML: 755 INJECTION, SOLUTION INTRAVENOUS at 11:42

## 2021-01-08 ENCOUNTER — HOSPITAL ENCOUNTER (OUTPATIENT)
Dept: CT IMAGING | Age: 41
Discharge: HOME | End: 2021-01-08
Payer: COMMERCIAL

## 2021-01-08 DIAGNOSIS — C50.812 MALIGNANT NEOPLASM OF OVERLAPPING SITES OF LEFT FEMALE BREAST, UNSPECIFIED ESTROGEN RECEPTOR STATUS (HCC): ICD-10-CM

## 2021-01-08 DIAGNOSIS — R91.1 PULMONARY PSEUDOTUMOR: ICD-10-CM

## 2021-01-08 PROCEDURE — 71250 CT THORAX DX C-: CPT

## 2021-02-19 ENCOUNTER — OFFICE VISIT (OUTPATIENT)
Dept: SURGERY | Age: 41
End: 2021-02-19
Payer: COMMERCIAL

## 2021-02-19 ENCOUNTER — HOSPITAL ENCOUNTER (OUTPATIENT)
Dept: MAMMOGRAPHY | Age: 41
Discharge: HOME | End: 2021-02-19
Payer: COMMERCIAL

## 2021-02-19 VITALS
HEART RATE: 104 BPM | BODY MASS INDEX: 37.48 KG/M2 | WEIGHT: 233.2 LBS | OXYGEN SATURATION: 96 % | HEIGHT: 66 IN | TEMPERATURE: 98.3 F | SYSTOLIC BLOOD PRESSURE: 118 MMHG | DIASTOLIC BLOOD PRESSURE: 82 MMHG

## 2021-02-19 DIAGNOSIS — C50.412 MALIGNANT NEOPLASM OF UPPER-OUTER QUADRANT OF LEFT BREAST IN FEMALE, ESTROGEN RECEPTOR POSITIVE (HCC): ICD-10-CM

## 2021-02-19 DIAGNOSIS — Z90.12 S/P PARTIAL MASTECTOMY, LEFT: ICD-10-CM

## 2021-02-19 DIAGNOSIS — C50.412 MALIGNANT NEOPLASM OF UPPER-OUTER QUADRANT OF LEFT BREAST IN FEMALE, ESTROGEN RECEPTOR POSITIVE (HCC): Primary | ICD-10-CM

## 2021-02-19 DIAGNOSIS — Z17.0 MALIGNANT NEOPLASM OF UPPER-OUTER QUADRANT OF LEFT BREAST IN FEMALE, ESTROGEN RECEPTOR POSITIVE (HCC): Primary | ICD-10-CM

## 2021-02-19 DIAGNOSIS — Z17.0 MALIGNANT NEOPLASM OF UPPER-OUTER QUADRANT OF LEFT BREAST IN FEMALE, ESTROGEN RECEPTOR POSITIVE (HCC): ICD-10-CM

## 2021-02-19 PROCEDURE — 1036F TOBACCO NON-USER: CPT | Performed by: SURGERY

## 2021-02-19 PROCEDURE — 77063 BREAST TOMOSYNTHESIS BI: CPT

## 2021-02-19 PROCEDURE — G8419 CALC BMI OUT NRM PARAM NOF/U: HCPCS | Performed by: SURGERY

## 2021-02-19 PROCEDURE — G8427 DOCREV CUR MEDS BY ELIG CLIN: HCPCS | Performed by: SURGERY

## 2021-02-19 PROCEDURE — G8484 FLU IMMUNIZE NO ADMIN: HCPCS | Performed by: SURGERY

## 2021-02-19 PROCEDURE — 99213 OFFICE O/P EST LOW 20 MIN: CPT | Performed by: SURGERY

## 2021-02-19 NOTE — PROGRESS NOTES
Subjective:      Patient ID: Leroy Kaye is a 38 y.o. female. HPI   Chief Complaint   Patient presents with    1 Year Follow Up     1 year follow up     Patient is s/p left lumpectomy/snbx/Biozorb insertion 8/16/2019 post neoadjuvant chemotherapy. 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 T2N1 ->T1bN0. ER/MS positive, Her2 positive. Postop radiation, on chemo. Receiving Lupron injections. She has not felt any masses, no breast pain or nipple discharge. Genetic testing no mutations. Bilateral mammogram today BIRADS 2. Past Medical History:   Diagnosis Date    Breast cancer ([facility] [address] 01/2019    Left breast    Sickle cell trait ([facility] [address]        Past Surgical History:   Procedure Laterality Date    BREAST LUMPECTOMY Left 8/16/2019    MAMMOGRAM GUIDED NEEDLE LOCALIZED LEFT BREAST PARTIAL MASTECTOMY performed by Geno Medina MD at [address] [facility] Right 4/1/2019    PORT REMOVAL performed by Victoria Mathew MD at [address] [place] [address]      X 1    INSERTION / REMOVAL / REPLACEMENT VENOUS ACCESS CATHETER N/A 2/7/2019    PORT-A-CATHETER PLACEMENT FOR CHEMOTHERAPY ACCISS/CHEMO TO START 2/8/19 WITH C-ARM performed by Geno Medina MD at [address] [place] [address] lymph node biopsy Left 8/16/2019    WITH LEFT AXILLARY SENTINEL NODE BIOSPY WITH RADIOSOTOPE AND GAMMA PROBE WITH POSSIBLE AXILLARY DISSECTION AND BIOZORB MARKER INSERTION performed by Geno Medina MD at [address] [facility] W/ SUBCUTANEOUS PORT Right 02/07/2019       Current Outpatient Medications   Medication Sig Dispense Refill    letrozole (FEMARA) 2.5 MG tablet Take 2.5 mg by mouth daily       No current facility-administered medications for this visit.         Social History     Socioeconomic History    Marital status: Single     Spouse name: Not on file    Number of children: Not on file    Years of education: Not on file    Highest education level: Not on file Rate and Rhythm: Normal rate. Pulmonary:      Effort: Pulmonary effort is normal. No respiratory distress. Breath sounds: No wheezing. Abdominal:      General: There is no distension. Palpations: Abdomen is soft. Musculoskeletal: Normal range of motion. General: No tenderness. Lymphadenopathy:      Cervical: No cervical adenopathy. Upper Body:      Right upper body: No axillary adenopathy. Left upper body: No axillary adenopathy. Skin:     General: Skin is warm and dry. Coloration: Skin is not pale. Findings: No erythema or rash. Neurological:      Mental Status: She is alert and oriented to person, place, and time. Cranial Nerves: No cranial nerve deficit. Coordination: Coordination normal.   Psychiatric:         Behavior: Behavior normal.         Thought Content: Thought content normal.         Judgment: Judgment normal.     left breast - well healed lumpectomy scar, no masses, no nipple or skin changes  Right breast - no masses, no nipple or skin changes    Assessment:       Diagnosis Orders   1. Malignant neoplasm of upper-outer quadrant of left breast in female, estrogen receptor positive ([facility] [address]  LEVI ADONAY DIGITAL SCREEN BILATERAL   2. S/P partial mastectomy, left  LEVI ADONAY DIGITAL SCREEN BILATERAL           Plan:      Mammogram was reviewed. At the present time, there is no concern for breast cancer recurrence. Healthy lifestyle modifications were reviewed with the patient.   Continue monthly self breast exam, f/u with me in 1 year with mammogram.             Ruddy Becker MD

## 2021-07-15 ENCOUNTER — TELEPHONE (OUTPATIENT)
Dept: SURGERY | Age: 41
End: 2021-07-15

## 2021-07-15 ENCOUNTER — HOSPITAL ENCOUNTER (OUTPATIENT)
Dept: CT IMAGING | Age: 41
Discharge: HOME | End: 2021-07-15
Payer: COMMERCIAL

## 2021-07-15 DIAGNOSIS — C50.412 MALIGNANT NEOPLASM OF UPPER-OUTER QUADRANT OF LEFT BREAST IN FEMALE, ESTROGEN RECEPTOR POSITIVE (HCC): ICD-10-CM

## 2021-07-15 DIAGNOSIS — Z17.0 MALIGNANT NEOPLASM OF UPPER-OUTER QUADRANT OF LEFT BREAST IN FEMALE, ESTROGEN RECEPTOR POSITIVE (HCC): ICD-10-CM

## 2021-07-15 PROCEDURE — 71250 CT THORAX DX C-: CPT

## 2021-07-15 NOTE — TELEPHONE ENCOUNTER
Called patient left message on voice mail to move appointment on 02/22/21 to 02/21/21 mammogram at 9:30 am and see Michelle Lundy at 10:30am.

## 2021-10-19 ENCOUNTER — TELEPHONE (OUTPATIENT)
Dept: GYNECOLOGY | Age: 41
End: 2021-10-19

## 2021-12-28 ENCOUNTER — HOSPITAL ENCOUNTER (OUTPATIENT)
Dept: CT IMAGING | Age: 41
Discharge: HOME | End: 2021-12-28
Payer: COMMERCIAL

## 2021-12-28 DIAGNOSIS — R91.1 PULMONARY NODULE: ICD-10-CM

## 2021-12-28 DIAGNOSIS — Z17.0 MALIGNANT NEOPLASM OF UPPER-OUTER QUADRANT OF LEFT BREAST IN FEMALE, ESTROGEN RECEPTOR POSITIVE (HCC): ICD-10-CM

## 2021-12-28 DIAGNOSIS — C50.412 MALIGNANT NEOPLASM OF UPPER-OUTER QUADRANT OF LEFT BREAST IN FEMALE, ESTROGEN RECEPTOR POSITIVE (HCC): ICD-10-CM

## 2021-12-28 DIAGNOSIS — C77.3 SECONDARY MALIGNANT NEOPLASM OF BRACHIAL LYMPH NODE (HCC): ICD-10-CM

## 2021-12-28 PROCEDURE — 6360000004 HC RX CONTRAST MEDICATION: Performed by: INTERNAL MEDICINE

## 2021-12-28 PROCEDURE — 71260 CT THORAX DX C+: CPT

## 2021-12-28 RX ADMIN — IOPAMIDOL 80 ML: 755 INJECTION, SOLUTION INTRAVENOUS at 13:13

## 2022-02-21 ENCOUNTER — HOSPITAL ENCOUNTER (OUTPATIENT)
Dept: MAMMOGRAPHY | Age: 42
Discharge: HOME | End: 2022-02-21
Payer: COMMERCIAL

## 2022-02-21 ENCOUNTER — OFFICE VISIT (OUTPATIENT)
Dept: SURGERY | Age: 42
End: 2022-02-21
Payer: COMMERCIAL

## 2022-02-21 VITALS
DIASTOLIC BLOOD PRESSURE: 68 MMHG | TEMPERATURE: 97.6 F | HEART RATE: 89 BPM | OXYGEN SATURATION: 100 % | SYSTOLIC BLOOD PRESSURE: 122 MMHG | BODY MASS INDEX: 39.53 KG/M2 | RESPIRATION RATE: 18 BRPM | HEIGHT: 66 IN | WEIGHT: 246 LBS

## 2022-02-21 VITALS — WEIGHT: 230 LBS | BODY MASS INDEX: 36.96 KG/M2 | HEIGHT: 66 IN

## 2022-02-21 DIAGNOSIS — Z17.0 MALIGNANT NEOPLASM OF UPPER-OUTER QUADRANT OF LEFT BREAST IN FEMALE, ESTROGEN RECEPTOR POSITIVE (HCC): ICD-10-CM

## 2022-02-21 DIAGNOSIS — Z12.39 ENCOUNTER FOR SCREENING BREAST EXAMINATION: Primary | ICD-10-CM

## 2022-02-21 DIAGNOSIS — Z90.12 HISTORY OF PARTIAL MASTECTOMY OF LEFT BREAST: ICD-10-CM

## 2022-02-21 DIAGNOSIS — C50.412 MALIGNANT NEOPLASM OF UPPER-OUTER QUADRANT OF LEFT BREAST IN FEMALE, ESTROGEN RECEPTOR POSITIVE (HCC): ICD-10-CM

## 2022-02-21 DIAGNOSIS — Z90.12 S/P PARTIAL MASTECTOMY, LEFT: ICD-10-CM

## 2022-02-21 DIAGNOSIS — Z08 ENCOUNTER FOR FOLLOW-UP SURVEILLANCE OF BREAST CANCER: ICD-10-CM

## 2022-02-21 DIAGNOSIS — Z85.3 ENCOUNTER FOR FOLLOW-UP SURVEILLANCE OF BREAST CANCER: ICD-10-CM

## 2022-02-21 DIAGNOSIS — Z85.3 HISTORY OF BREAST CANCER: ICD-10-CM

## 2022-02-21 PROCEDURE — G8427 DOCREV CUR MEDS BY ELIG CLIN: HCPCS | Performed by: NURSE PRACTITIONER

## 2022-02-21 PROCEDURE — 77063 BREAST TOMOSYNTHESIS BI: CPT

## 2022-02-21 PROCEDURE — G8417 CALC BMI ABV UP PARAM F/U: HCPCS | Performed by: NURSE PRACTITIONER

## 2022-02-21 PROCEDURE — G8484 FLU IMMUNIZE NO ADMIN: HCPCS | Performed by: NURSE PRACTITIONER

## 2022-02-21 PROCEDURE — 1036F TOBACCO NON-USER: CPT | Performed by: NURSE PRACTITIONER

## 2022-02-21 PROCEDURE — 99213 OFFICE O/P EST LOW 20 MIN: CPT | Performed by: NURSE PRACTITIONER

## 2022-02-21 ASSESSMENT — ENCOUNTER SYMPTOMS
SHORTNESS OF BREATH: 0
COUGH: 0
ABDOMINAL PAIN: 0

## 2022-02-21 NOTE — PATIENT INSTRUCTIONS
Healthy Lifestyle Recommendations: healthy diet (decrease consumption of red meat, increase fresh fruits and vegetables), decreased alcohol consumption (less than 4 drinks/week), adequate sleep (goal 6-8 hours), routine exercise (goal 150 minutes/week or greater), weight control. Patient Education        Breast Self-Exam: Care Instructions  Your Care Instructions     A breast self-exam is when you check your breasts for lumps or changes. This regular exam helps you learn how your breasts normally look and feel. Most breast problems or changes are not because of cancer. Breast self-exam is not a substitute for a mammogram. Having regular breast exams by your doctor and regular mammograms improve your chances of finding any problems with your breasts. Some women set a time each month to do a step-by-step breast self-exam. Other women like a less formal system. They might look at their breasts as they brush their teeth, or feel their breasts once in a while in the shower. If you notice a change in your breast, tell your doctor. Follow-up care is a key part of your treatment and safety. Be sure to make and go to all appointments, and call your doctor if you are having problems. It's also a good idea to know your test results and keep a list of the medicines you take. How do you do a breast self-exam?  · The best time to examine your breasts is usually one week after your menstrual period begins. Your breasts should not be tender then. If you do not have periods, you might do your exam on a day of the month that is easy to remember. · To examine your breasts:  ? Remove all your clothes above the waist and lie down. When you are lying down, your breast tissue spreads evenly over your chest wall, which makes it easier to feel all your breast tissue. ?  Use the padsnot the fingertipsof the 3 middle fingers of your left hand to check your right breast. Move your fingers slowly in small coin-sized circles that overlap. ? Use three levels of pressure to feel of all your breast tissue. Use light pressure to feel the tissue close to the skin surface. Use medium pressure to feel a little deeper. Use firm pressure to feel your tissue close to your breastbone and ribs. Use each pressure level to feel your breast tissue before moving on to the next spot. ? Check your entire breast, moving up and down as if following a strip from the collarbone to the bra line, and from the armpit to the ribs. Repeat until you have covered the entire breast.  ? Repeat this procedure for your left breast, using the pads of the 3 middle fingers of your right hand. · To examine your breasts while in the shower:  ? Place one arm over your head and lightly soap your breast on that side. ? Using the pads of your fingers, gently move your hand over your breast (in the strip pattern described above), feeling carefully for any lumps or changes. ? Repeat for the other breast.  · Have your doctor inspect anything you notice to see if you need further testing. Where can you learn more? Go to https://[employer]. org and sign in to your [employer] account. Enter P148 in the 56.com box to learn more about \"Breast Self-Exam: Care Instructions. \"     If you do not have an account, please click on the \"Sign Up Now\" link. Current as of: September 8, 2021               Content Version: 13.1  © 2006-2021 Healthwise, Incorporated. Care instructions adapted under license by [facility] ([facility]). If you have questions about a medical condition or this instruction, always ask your healthcare professional. [place] [address] any warranty or liability for your use of this information.

## 2022-02-21 NOTE — PROGRESS NOTES
[facility])   Surgical Breast Oncology      Medical Oncologist: Beau   Radiation Oncologist: Aubree Lane       CC: One year follow-uppresents for breast check and mammogram     HPI: Flako Linares is a 42 y.o. woman here for annual follow up for breast check secondary to personal history of left breast cancer. She is s/p left partial mastectomy/snbx/Biozorb insertion 8/16/2019 post neoadjuvant chemotherapy (6 cycles of TCHP and maintenance Pertuzumab/trastuzumab). 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 Stage IIB  T2N1 ->T1bN0. ER/UT positive, Her2 positive. Postop radiation (completed 11/2019). Completed adjuvant therapy with Kadcyla 6/2020. Receiving Lupron injections/letrozole (started 9/27/2019).   Negative genetic testing. Developed port infection and bacterial endocarditis. She has no breast related concerns today. She states that she does perform routine self breast evaluations and has not noticed any new abnormalities such as masses, skin changes, color changes,nipple discharge, or changes to the nipple-areolar complex. Mammogram today reveals no new suspicious or concerning findings suggestive of malignancy. BI-RADS 2. Dr. Paul Benton recommended bilateral salpingo-oophorectomies, she has an appt to see Dr. Nancy Tompkins in March.         Past Medical History:   Diagnosis Date    Breast cancer ([facility] [address] 01/2019    Left breast    History of therapeutic radiation 2019    Hx antineoplastic chemo 2019    Sickle cell trait ([facility] [address]        Past Surgical History:   Procedure Laterality Date    BREAST LUMPECTOMY Left 8/16/2019    MAMMOGRAM GUIDED NEEDLE LOCALIZED LEFT BREAST PARTIAL MASTECTOMY performed by Imani Preciado MD at [address] Right 4/1/2019    PORT REMOVAL performed by Lowell Moran MD at [address] [place] [address]      X 1    INSERTION / REMOVAL / REPLACEMENT VENOUS ACCESS CATHETER N/A 2/7/2019    PORT-A-CATHETER PLACEMENT FOR CHEMOTHERAPY ACCISS/CHEMO TO START 2/8/19 WITH C-ARM performed by Mariela Ansari MD at [address] [facility] [address] lymph node biopsy Left 8/16/2019    WITH LEFT AXILLARY SENTINEL NODE BIOSPY WITH RADIOSOTOPE AND GAMMA PROBE WITH POSSIBLE AXILLARY DISSECTION AND BIOZORB MARKER INSERTION performed by Mariela Ansari MD at [address] [place] [address] Ave TUNNELED CENTRAL VENOUS CATHETER W/ SUBCUTANEOUS PORT Right 02/07/2019       Family History   Problem Relation Age of Onset    High Blood Pressure Mother     Diabetes Mother     High Blood Pressure Father     Anemia Sister         Sickle Cell    Cancer Maternal Aunt         Throat    Colon Cancer Maternal Grandmother        Allergies as of 02/21/2022    (No Known Allergies)       Social History     Tobacco Use    Smoking status: Never Smoker    Smokeless tobacco: Never Used   Vaping Use    Vaping Use: Never used   Substance Use Topics    Alcohol use: No    Drug use: No       Current Outpatient Medications on File Prior to Visit   Medication Sig Dispense Refill    letrozole (FEMARA) 2.5 MG tablet Take 2.5 mg by mouth daily       No current facility-administered medications on file prior to visit. Medications: documentation has been reviewed in the electronic medical record and patient office intake form. REVIEW OF SYSTEMS:  Constitutional: Negative for unexpected weight change. Eyes: Negative for visual disturbance. Respiratory: Negative for cough and shortness of breath. Cardiovascular: Negative for chest pain. Gastrointestinal: Negative for abdominal pain. Musculoskeletal: Negative for arthralgias and myalgias. Neurological: Negative for headaches. Hematological: Negative for adenopathy. Psychiatric/Behavioral: Negative for dysphoric mood.  The patient is not nervous/anxious.              PHYSICAL EXAM:  /68   Pulse 89   Temp 97.6 °F (36.4 °C)   Resp 18   Ht 5' 6\" (1.676 m)   Wt 246 lb (111.6 kg)   SpO2 100%   BMI 39.71 kg/m²   Constitutional: She appearswell-nourished. No apparent distress. Breast: The patient was examined in the upright and supine position. Breasts are symmetrically ptotic. Right: No new masses or changes in breast contour. No skin changes of the breast or nipple areolar complex. No nipple inversion or discharge. No erythema, thickening (peau d'orange), or dimpling. Left: No new masses or changes in breast contour. No skin changes of the breast or nipple areolar complex. No nipple inversion or discharge. No erythema, thickening (peau d'orange), or dimpling. There is no axillary lymphadenopathy palpated bilaterally. Head: Normocephalic and atraumatic:   Eyes: EOM are normal. Pupils are equal, round, and reactive to light. Neck: Neck supple. No tracheal deviation present. No obvious mass. Lymphatics: No palpable supraclavicular, cervical, or axillary lymphadenopathy  Skin: No rash noted. No erythema. Neurologic: alert and oriented. Extremities: appear well perfused. ASSESSMENT:  - Screening Breast Examination - stable breast examination and stable screening mammogram.   No concerning findings suggestive of malignancy or recurrence at this time. - Personal History of Breast Cancer -  s/p left partial mastectomy/snbx/Biozorb insertion 8/16/2019 post neoadjuvant chemotherapy (6 cycles of TCHP and maintenance Pertuzumab/trastuzumab). 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 Stage IIB  T2N1 ->T1bN0. ER/WV positive, Her2 positive. Postop radiation (completed 11/2019). Completed adjuvant therapy with Kadcyla 6/2020.   - Aromatase inhibitor use - Receiving Lupron injections/letrozole (started 9/27/2019) per medical oncology   - Encounter for follow-up surveillance of breast cancer      PLAN:    Continue annual screening mammography with tomosynthesis.   Due 2/2023   Continue annual clinical breast exam   Continue endocrine therapy per medical oncology    Agree with Dr. Vance Apley recommendation for bilateral salpingo-oophorectomies, patient thinks she will have the surgery, considering sometime in the summer.  Signs/symptoms of recurrence were reviewed. She verbalizes understanding that she should notify the office if she identifies any abnormalities on self evaluation.  Follow up cancer surveillance discussed    Discussed the importance of breast awareness including the importance and technique of self breast exams   Today's imaging was reviewed, documented above, the results were discussed with the patient, all questions answered   Healthy Lifestyle Recommendations: healthy diet (decrease consumption of red meat, increase fresh fruits and vegetables), decreased alcohol consumption (less than 4 drinks/week), adequate sleep (goal 6-8 hours), routine exercise (goal 150 minutes/week or greater), weight control.  Follow up after annual mammogram in 1 year          SOFIE Ribeiro  [facility])   Surgical Breast Oncology   [phone]    All of the patient's questions were answered at this time however, she was encouraged to call the office with any further inquiries. Approximately 20 minutes of time were spent in preparation, direct patient contact, counseling, care coordination, documentation and activities otherwise related to this encounter.

## 2022-07-25 ENCOUNTER — OFFICE VISIT (OUTPATIENT)
Dept: PRIMARY CARE CLINIC | Age: 42
End: 2022-07-25
Payer: COMMERCIAL

## 2022-07-25 VITALS
HEIGHT: 66 IN | DIASTOLIC BLOOD PRESSURE: 72 MMHG | BODY MASS INDEX: 39.82 KG/M2 | OXYGEN SATURATION: 98 % | SYSTOLIC BLOOD PRESSURE: 104 MMHG | HEART RATE: 91 BPM | WEIGHT: 247.8 LBS | TEMPERATURE: 98.1 F

## 2022-07-25 DIAGNOSIS — C50.412 MALIGNANT NEOPLASM OF UPPER-OUTER QUADRANT OF LEFT BREAST IN FEMALE, ESTROGEN RECEPTOR POSITIVE (HCC): ICD-10-CM

## 2022-07-25 DIAGNOSIS — Z17.0 MALIGNANT NEOPLASM OF UPPER-OUTER QUADRANT OF LEFT BREAST IN FEMALE, ESTROGEN RECEPTOR POSITIVE (HCC): ICD-10-CM

## 2022-07-25 DIAGNOSIS — Z00.00 ROUTINE PHYSICAL EXAMINATION: Primary | ICD-10-CM

## 2022-07-25 PROBLEM — D57.3 SICKLE CELL TRAIT (HCC): Status: ACTIVE | Noted: 2022-07-25

## 2022-07-25 PROBLEM — C50.411 MALIGNANT NEOPLASM OF UPPER-OUTER QUADRANT OF RIGHT BREAST IN FEMALE, ESTROGEN RECEPTOR POSITIVE (HCC): Status: RESOLVED | Noted: 2019-01-18 | Resolved: 2022-07-25

## 2022-07-25 PROBLEM — R78.81 STAPHYLOCOCCUS AUREUS BACTEREMIA: Status: RESOLVED | Noted: 2019-03-30 | Resolved: 2022-07-25

## 2022-07-25 PROBLEM — T81.89XA WOUND, SURGICAL, NONHEALING, INITIAL ENCOUNTER: Status: RESOLVED | Noted: 2019-05-10 | Resolved: 2022-07-25

## 2022-07-25 PROBLEM — B95.61 STAPHYLOCOCCUS AUREUS BACTEREMIA: Status: RESOLVED | Noted: 2019-03-30 | Resolved: 2022-07-25

## 2022-07-25 PROCEDURE — 99386 PREV VISIT NEW AGE 40-64: CPT | Performed by: FAMILY MEDICINE

## 2022-07-25 SDOH — ECONOMIC STABILITY: FOOD INSECURITY: WITHIN THE PAST 12 MONTHS, YOU WORRIED THAT YOUR FOOD WOULD RUN OUT BEFORE YOU GOT MONEY TO BUY MORE.: NEVER TRUE

## 2022-07-25 SDOH — ECONOMIC STABILITY: FOOD INSECURITY: WITHIN THE PAST 12 MONTHS, THE FOOD YOU BOUGHT JUST DIDN'T LAST AND YOU DIDN'T HAVE MONEY TO GET MORE.: NEVER TRUE

## 2022-07-25 ASSESSMENT — PATIENT HEALTH QUESTIONNAIRE - PHQ9
SUM OF ALL RESPONSES TO PHQ QUESTIONS 1-9: 0
1. LITTLE INTEREST OR PLEASURE IN DOING THINGS: 0
2. FEELING DOWN, DEPRESSED OR HOPELESS: 0
SUM OF ALL RESPONSES TO PHQ9 QUESTIONS 1 & 2: 0
SUM OF ALL RESPONSES TO PHQ QUESTIONS 1-9: 0

## 2022-07-25 ASSESSMENT — SOCIAL DETERMINANTS OF HEALTH (SDOH): HOW HARD IS IT FOR YOU TO PAY FOR THE VERY BASICS LIKE FOOD, HOUSING, MEDICAL CARE, AND HEATING?: NOT HARD AT ALL

## 2022-07-25 NOTE — PATIENT INSTRUCTIONS
Dr. Sherren Cloud, MD  [address] [facility]  [address]  [address]  [address] [facility] Laboratory Locations - No appointment necessary. @ indicates the location is open Saturdays in addition to Monday through Friday. Call your preferred location for test preparation, business hours and other information you need. [employer] accepts [employer]. [facility]    @ [facility] [address] [facility] [id number] [place] [address]. Jessica, [address]   Ph: [phone] [facility] Lab Svcs. [address]   Ph: [phone]  @ [facility] Svcs. [address] [facility]   [facility]   Ph: [phone]    [facility] Lab Svcs. [address]   Ph: [phone] @ [place] [address]  Ph: [phone] @ [facility] Sv[facility]. [address] [facility]. Romaine Lopez [facility]   Ph: [phone]     [facility] [place] [address]  Ph: [phone]    [place]   @ [place]. [address] [place] [address]   Ph: [phone] [address] Med. Office Bldg. [address] Sahil Brito [place] [address], [address]  Ph: [phone] [facility], [address]   [facility]:  [address] Lab Svcs[address] 54 [facility]   Ph: [phone] [facility]. Lab Svcs.   [address] [facility], [address] [facility] [address]   Ph: [phone]

## 2022-07-25 NOTE — PROGRESS NOTES
RADIOSOTOPE AND GAMMA PROBE WITH POSSIBLE AXILLARY DISSECTION AND BIOZORB MARKER INSERTION performed by Aster Bazzi MD at [facility] W/ SUBCUTANEOUS PORT Right 02/07/2019        No Known Allergies     Family History   Problem Relation Age of Onset    High Blood Pressure Mother     Diabetes Mother     High Blood Pressure Father     Anemia Sister         Sickle Cell    Cancer Maternal Aunt         Throat    Colon Cancer Maternal Grandmother         Patient's past medical history, surgical history, family history, medications, and allergies  were all reviewed and updated as appropriate today. Review of Systems   Constitutional:  Negative for chills, fever and unexpected weight change. Respiratory:  Negative for shortness of breath. Cardiovascular:  Negative for chest pain. Gastrointestinal:  Negative for abdominal pain, diarrhea, nausea and vomiting. /72 (Cuff Size: Large Adult)   Pulse 91   Temp 98.1 °F (36.7 °C) (Temporal)   Ht 5' 5.5\" (1.664 m)   Wt 247 lb 12.8 oz (112.4 kg)   LMP 04/18/2019   SpO2 98% Comment: room air  BMI 40.61 kg/m²      Physical Exam  Vitals reviewed. Constitutional:       General: She is not in acute distress. Appearance: Normal appearance. She is well-developed. She is not ill-appearing or toxic-appearing. Eyes:      General: No scleral icterus. Conjunctiva/sclera: Conjunctivae normal.   Neck:      Thyroid: No thyromegaly. Cardiovascular:      Rate and Rhythm: Normal rate and regular rhythm. Heart sounds: No murmur heard. Pulmonary:      Effort: Pulmonary effort is normal. No respiratory distress. Breath sounds: Normal breath sounds. Abdominal:      General: There is no distension. Palpations: Abdomen is soft. Tenderness: There is no abdominal tenderness. Musculoskeletal:      Cervical back: Neck supple. Right lower leg: No edema. Left lower leg: No edema.    Lymphadenopathy:

## 2022-07-27 ASSESSMENT — ENCOUNTER SYMPTOMS
ABDOMINAL PAIN: 0
SHORTNESS OF BREATH: 0
NAUSEA: 0
VOMITING: 0
DIARRHEA: 0

## 2022-10-03 ENCOUNTER — HOSPITAL ENCOUNTER (OUTPATIENT)
Dept: GENERAL RADIOLOGY | Age: 42
Discharge: HOME | End: 2022-10-03
Payer: COMMERCIAL

## 2022-10-03 DIAGNOSIS — C50.812 MALIGNANT NEOPLASM OF OVERLAPPING SITES OF LEFT BREAST IN FEMALE, ESTROGEN RECEPTOR POSITIVE (HCC): ICD-10-CM

## 2022-10-03 DIAGNOSIS — Z79.811 USE OF AROMATASE INHIBITORS: ICD-10-CM

## 2022-10-03 DIAGNOSIS — Z17.0 MALIGNANT NEOPLASM OF OVERLAPPING SITES OF LEFT BREAST IN FEMALE, ESTROGEN RECEPTOR POSITIVE (HCC): ICD-10-CM

## 2022-10-03 PROCEDURE — 77080 DXA BONE DENSITY AXIAL: CPT

## 2022-10-18 ENCOUNTER — OFFICE VISIT (OUTPATIENT)
Dept: GYNECOLOGY | Age: 42
End: 2022-10-18
Payer: COMMERCIAL

## 2022-10-18 VITALS
BODY MASS INDEX: 39.37 KG/M2 | SYSTOLIC BLOOD PRESSURE: 110 MMHG | WEIGHT: 245 LBS | HEIGHT: 66 IN | OXYGEN SATURATION: 100 % | HEART RATE: 84 BPM | RESPIRATION RATE: 17 BRPM | DIASTOLIC BLOOD PRESSURE: 74 MMHG

## 2022-10-18 DIAGNOSIS — Z01.419 WELL WOMAN EXAM WITH ROUTINE GYNECOLOGICAL EXAM: Primary | ICD-10-CM

## 2022-10-18 DIAGNOSIS — Z17.0 MALIGNANT NEOPLASM OF LEFT BREAST IN FEMALE, ESTROGEN RECEPTOR POSITIVE, UNSPECIFIED SITE OF BREAST (HCC): ICD-10-CM

## 2022-10-18 DIAGNOSIS — C50.912 MALIGNANT NEOPLASM OF LEFT BREAST IN FEMALE, ESTROGEN RECEPTOR POSITIVE, UNSPECIFIED SITE OF BREAST (HCC): ICD-10-CM

## 2022-10-18 DIAGNOSIS — R10.2 PELVIC CRAMPING: ICD-10-CM

## 2022-10-18 PROCEDURE — 99386 PREV VISIT NEW AGE 40-64: CPT | Performed by: OBSTETRICS & GYNECOLOGY

## 2022-10-18 ASSESSMENT — ENCOUNTER SYMPTOMS
RESPIRATORY NEGATIVE: 1
GASTROINTESTINAL NEGATIVE: 1
ALLERGIC/IMMUNOLOGIC NEGATIVE: 1
EYES NEGATIVE: 1

## 2022-10-19 NOTE — PROGRESS NOTES
Subjective:      Patient ID: Vanessa Green is a 36 y.o. female. Patient is here for annual. Patient with estrogen receptor positive breast cancer. On lupron. Needs to think about getting ovaries removed per Oncology. Gynecologic Exam      Review of Systems   Constitutional: Negative. HENT: Negative. Eyes: Negative. Respiratory: Negative. Cardiovascular: Negative. Gastrointestinal: Negative. Endocrine: Negative. Genitourinary: Negative. Musculoskeletal: Negative. Skin: Negative. Allergic/Immunologic: Negative. Neurological: Negative. Hematological: Negative. Psychiatric/Behavioral: Negative.        Date of Birth 1980  Past Medical History:   Diagnosis Date    Breast cancer ([facility] [address] 2019    Left breast    Breast mass     History of bacterial endocarditis     Related to port infection, resolved    History of therapeutic radiation     Hx antineoplastic chemo     Irregular uterine bleeding     [facility]      Past Surgical History:   Procedure Laterality Date    BREAST BIOPSY      BREAST LUMPECTOMY Left 2019    MAMMOGRAM GUIDED NEEDLE LOCALIZED LEFT BREAST PARTIAL MASTECTOMY performed by Gerald Vegas MD at [address] [facility] Right 2019    PORT REMOVAL performed by Sherryle Reel, MD at [address] [facility] [address]      X 1    INSERTION / REMOVAL / [address] N/A 2019    PORT-A-CATHETER PLACEMENT FOR CHEMOTHERAPY ACCISS/CHEMO TO START 19 WITH C-ARM performed by Gerald Vegas MD at [address] [facility] lymph node biopsy Left 2019    WITH LEFT AXILLARY SENTINEL NODE BIOSPY WITH RADIOSOTOPE AND GAMMA PROBE WITH POSSIBLE AXILLARY DISSECTION AND BIOZORB MARKER INSERTION performed by Gerald Vegas MD at [address] [facility] Right 2019     OB History    Para Term  AB Living   2 2 1 1       SAB IAB Ectopic Molar Multiple Live Births                    # Outcome Date GA Lbr Ilia/2nd Weight Sex Delivery Anes PTL Lv   2 Term     M Vag-Spont      1      M CS-LTranv        Social History     Socioeconomic History    Marital status: Single     Spouse name: Not on file    Number of children: Not on file    Years of education: Not on file    Highest education level: Not on file   Occupational History    Not on file   Tobacco Use    Smoking status: Never    Smokeless tobacco: Never   Vaping Use    Vaping Use: Never used   Substance and Sexual Activity    Alcohol use: No    Drug use: No    Sexual activity: Yes     Partners: Male   Other Topics Concern    Not on file   Social History Narrative    Not on file     Social Determinants of Health     Financial Resource Strain: Low Risk     Difficulty of Paying Living Expenses: Not hard at all   Food Insecurity: No Food Insecurity    Worried About Running Out of Food in the Last Year: Never true    Ran Out of Food in the Last Year: Never true   Transportation Needs: Not on file   Physical Activity: Not on file   Stress: Not on file   Social Connections: Not on file   Intimate Partner Violence: Not on file   Housing Stability: Not on file     No Known Allergies  Outpatient Medications Marked as Taking for the 10/18/22 encounter (Office Visit) with Lamont Juarez MD   Medication Sig Dispense Refill    [START ON 2023] Leuprolide Acetate (LUPRON IJ) Infuse 50 mg intravenously      letrozole (FEMARA) 2.5 MG tablet Take 2.5 mg by mouth daily       Family History   Problem Relation Age of Onset    High Blood Pressure Mother     Diabetes Mother     High Blood Pressure Father     Anemia Sister         Sickle Cell    Cancer Maternal Aunt         Throat    Colon Cancer Maternal Grandmother      /74 (Site: Right Upper Arm, Position: Sitting, Cuff Size: Large Adult)   Pulse 84   Resp 17   Ht 5' 5.5\" (1.664 m)   Wt 245 lb (111.1 kg)   LMP 2019   SpO2 100%   BMI 40.15 kg/m² Objective:   Physical Exam  Constitutional:       Appearance: Normal appearance. She is well-developed and normal weight. HENT:      Head: Normocephalic and atraumatic. Nose: Nose normal.      Mouth/Throat:      Mouth: Mucous membranes are moist.      Pharynx: Oropharynx is clear. Eyes:      Extraocular Movements: Extraocular movements intact. Neck:      Thyroid: No thyromegaly. Cardiovascular:      Rate and Rhythm: Normal rate and regular rhythm. Pulses: Normal pulses. Heart sounds: Normal heart sounds. No murmur heard. No friction rub. No gallop. Pulmonary:      Effort: Pulmonary effort is normal. No respiratory distress. Breath sounds: Normal breath sounds. No stridor. No wheezing, rhonchi or rales. Chest:      Chest wall: No tenderness. Breasts:     Right: Normal. No swelling, bleeding, inverted nipple, mass, nipple discharge, skin change or tenderness. Left: No swelling, bleeding, inverted nipple, mass, nipple discharge, skin change or tenderness. Comments: Slight scarring on left breast  Abdominal:      General: Bowel sounds are normal. There is no distension. Palpations: Abdomen is soft. There is no mass. Tenderness: There is no abdominal tenderness. There is no guarding or rebound. Hernia: No hernia is present. There is no hernia in the left inguinal area or right inguinal area. Genitourinary:     General: Normal vulva. Exam position: Lithotomy position. Pubic Area: No rash. Labia:         Right: No rash, tenderness, lesion or injury. Left: No rash, tenderness, lesion or injury. Urethra: No prolapse, urethral pain, urethral swelling or urethral lesion. Vagina: No signs of injury and foreign body. No vaginal discharge, erythema, tenderness, bleeding, lesions or prolapsed vaginal walls. Cervix: No cervical motion tenderness, discharge, friability, lesion, erythema, cervical bleeding or eversion. Uterus: Not deviated, not enlarged, not fixed, not tender and no uterine prolapse. Adnexa:         Right: No mass, tenderness or fullness. Left: No mass, tenderness or fullness. Rectum: No anal fissure or external hemorrhoid. Comments: Normal urethral meatus, normal urethra, nl bladder  Musculoskeletal:         General: No swelling, tenderness, deformity or signs of injury. Normal range of motion. Cervical back: Normal range of motion and neck supple. No rigidity. Lymphadenopathy:      Cervical: No cervical adenopathy. Lower Body: No right inguinal adenopathy. No left inguinal adenopathy. Skin:     General: Skin is warm and dry. Coloration: Skin is not jaundiced or pale. Findings: No bruising, erythema, lesion or rash. Neurological:      General: No focal deficit present. Mental Status: She is alert and oriented to person, place, and time. Mental status is at baseline. Deep Tendon Reflexes: Reflexes are normal and symmetric. Psychiatric:         Mood and Affect: Mood normal.         Behavior: Behavior normal.         Thought Content: Thought content normal.         Judgment: Judgment normal.       Assessment:      Annual  Hx breast cancer  Estrogen receptor pos cancer      Plan:      Pap, calcium, exercise, mammogram  Care per Dr. Shamika Grider  Discussed BSO and risks/benefits of surgery. Will think about-pelvic US, with cramping, too.         Noelle Desir MD

## 2023-03-13 ENCOUNTER — OFFICE VISIT (OUTPATIENT)
Dept: SURGERY | Age: 43
End: 2023-03-13
Payer: COMMERCIAL

## 2023-03-13 ENCOUNTER — HOSPITAL ENCOUNTER (OUTPATIENT)
Dept: MAMMOGRAPHY | Age: 43
Discharge: HOME | End: 2023-03-13
Payer: COMMERCIAL

## 2023-03-13 VITALS — WEIGHT: 200 LBS | BODY MASS INDEX: 33.32 KG/M2 | HEIGHT: 65 IN

## 2023-03-13 VITALS
HEIGHT: 65 IN | RESPIRATION RATE: 18 BRPM | TEMPERATURE: 97 F | OXYGEN SATURATION: 98 % | BODY MASS INDEX: 40.22 KG/M2 | WEIGHT: 241.4 LBS | HEART RATE: 74 BPM

## 2023-03-13 DIAGNOSIS — Z85.3 ENCOUNTER FOR FOLLOW-UP SURVEILLANCE OF BREAST CANCER: ICD-10-CM

## 2023-03-13 DIAGNOSIS — Z12.31 ENCOUNTER FOR SCREENING MAMMOGRAM FOR BREAST CANCER: ICD-10-CM

## 2023-03-13 DIAGNOSIS — Z08 ENCOUNTER FOR FOLLOW-UP SURVEILLANCE OF BREAST CANCER: ICD-10-CM

## 2023-03-13 DIAGNOSIS — Z85.3 HISTORY OF BREAST CANCER: ICD-10-CM

## 2023-03-13 DIAGNOSIS — Z90.12 HISTORY OF PARTIAL MASTECTOMY OF LEFT BREAST: ICD-10-CM

## 2023-03-13 DIAGNOSIS — Z12.39 ENCOUNTER FOR SCREENING BREAST EXAMINATION: Primary | ICD-10-CM

## 2023-03-13 DIAGNOSIS — Z85.3 HISTORY OF BREAST CANCER: Primary | ICD-10-CM

## 2023-03-13 PROCEDURE — G8484 FLU IMMUNIZE NO ADMIN: HCPCS | Performed by: NURSE PRACTITIONER

## 2023-03-13 PROCEDURE — 77063 BREAST TOMOSYNTHESIS BI: CPT

## 2023-03-13 PROCEDURE — G8427 DOCREV CUR MEDS BY ELIG CLIN: HCPCS | Performed by: NURSE PRACTITIONER

## 2023-03-13 PROCEDURE — 1036F TOBACCO NON-USER: CPT | Performed by: NURSE PRACTITIONER

## 2023-03-13 PROCEDURE — G8417 CALC BMI ABV UP PARAM F/U: HCPCS | Performed by: NURSE PRACTITIONER

## 2023-03-13 PROCEDURE — 99213 OFFICE O/P EST LOW 20 MIN: CPT | Performed by: NURSE PRACTITIONER

## 2023-03-13 NOTE — PATIENT INSTRUCTIONS
Healthy Lifestyle Recommendations: healthy diet (decrease consumption of red meat, increase fresh fruits and vegetables), decreased alcohol consumption (less than 4 drinks/week), adequate sleep (goal 6-8 hours), routine exercise (goal 150 minutes/week or greater), weight control. Patient Education        Breast Self-Exam: Care Instructions  Your Care Instructions     A breast self-exam is when you check your breasts for lumps or changes. This regular exam helps you learn how your breasts normally look and feel. Most breast problems or changes are not because of cancer. Breast self-exam is not a substitute for a mammogram. Having regular breast exams by your doctor and regular mammograms improve your chances of finding any problems with your breasts. Some women set a time each month to do a step-by-step breast self-exam. Other women like a less formal system. They might look at their breasts as they brush their teeth, or feel their breasts once in a while in the shower. If you notice a change in your breast, tell your doctor. Follow-up care is a key part of your treatment and safety. Be sure to make and go to all appointments, and call your doctor if you are having problems. It's also a good idea to know your test results and keep a list of the medicines you take. How do you do a breast self-exam?  The best time to examine your breasts is usually one week after your menstrual period begins. Your breasts should not be tender then. If you do not have periods, you might do your exam on a day of the month that is easy to remember. To examine your breasts:  Remove all your clothes above the waist and lie down. When you are lying down, your breast tissue spreads evenly over your chest wall, which makes it easier to feel all your breast tissue. Use the pads--not the fingertips--of the 3 middle fingers of your left hand to check your right breast. Move your fingers slowly in small coin-sized circles that overlap.   Use three levels of pressure to feel of all your breast tissue. Use light pressure to feel the tissue close to the skin surface. Use medium pressure to feel a little deeper. Use firm pressure to feel your tissue close to your breastbone and ribs. Use each pressure level to feel your breast tissue before moving on to the next spot. Check your entire breast, moving up and down as if following a strip from the collarbone to the bra line, and from the armpit to the ribs. Repeat until you have covered the entire breast.  Repeat this procedure for your left breast, using the pads of the 3 middle fingers of your right hand. To examine your breasts while in the shower:  Place one arm over your head and lightly soap your breast on that side. Using the pads of your fingers, gently move your hand over your breast (in the strip pattern described above), feeling carefully for any lumps or changes. Repeat for the other breast.  Have your doctor inspect anything you notice to see if you need further testing. Where can you learn more? Go to http://www.woods.com/ and enter P148 to learn more about \"Breast Self-Exam: Care Instructions. \"  Current as of: August 2, 2022               Content Version: 13.5  © 2006-2022 [employer]. Care instructions adapted under license by [facility] ([facility] and [facility]). If you have questions about a medical condition or this instruction, always ask your healthcare professional. [place] [address] any warranty or liability for your use of this information.

## 2023-03-13 NOTE — PROGRESS NOTES
[facility] ([facility])   Surgical Breast Oncology      Medical Oncologist: Beau   Radiation Oncologist: Kate Sanchez       CC: One year [id number] for breast check and mammogram     HPI: Abelardo Marcano is a 36 y.o. woman here for annual follow up for breast check secondary to personal history of left breast cancer. She is s/p left partial mastectomy/snbx/Biozorb insertion 8/16/2019 post neoadjuvant chemotherapy (6 cycles of TCHP and maintenance Pertuzumab/trastuzumab). 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 Stage IIB  T2N1 ->T1bN0. ER/OK positive, Her2 positive. Postop radiation (completed 11/2019). Completed adjuvant therapy with Kadcyla 6/2020. Receiving Lupron injections/letrozole (started 9/27/2019). Negative genetic testing. Developed port infection and bacterial endocarditis. She has no breast related concerns today. She states that she does perform routine self breast evaluations and has not noticed any new abnormalities such as masses, skin changes, color changes,nipple discharge, or changes to the nipple-areolar complex. Mammogram today reveals no new suspicious or concerning findings suggestive of malignancy. BI-RADS 2. Dr. Lenore Wright recommended bilateral salpingo-oophorectomies, she saw Dr. Davis Falk and is considering but not ready to move forward at this time.       Past Medical History:   Diagnosis Date    Breast cancer ([address] 01/2019    Left breast    Breast mass     History of bacterial endocarditis     Related to port infection, resolved    History of therapeutic radiation 2019    Hx antineoplastic chemo 2019    Irregular uterine bleeding     Sickle cell trait [facility])        Past Surgical History:   Procedure Laterality Date    BREAST BIOPSY      BREAST LUMPECTOMY Left 08/16/2019    MAMMOGRAM GUIDED NEEDLE LOCALIZED LEFT BREAST PARTIAL MASTECTOMY performed by Yin Ortega MD at [address] Right 04/01/2019    PORT REMOVAL performed by Gissel Da Silva MD [place] at [address]      X 1    INSERTION / REMOVAL / REPLACEMENT VENOUS ACCESS CATHETER N/A 02/07/2019    PORT-A-CATHETER PLACEMENT FOR CHEMOTHERAPY ACCISS/CHEMO TO START 2/8/19 WITH C-ARM performed by Conchita Bae MD at [address] lymph node biopsy Left 08/16/2019    WITH LEFT AXILLARY SENTINEL NODE BIOSPY WITH RADIOSOTOPE AND GAMMA PROBE WITH POSSIBLE AXILLARY DISSECTION AND BIOZORB MARKER INSERTION performed by Conchita Bae MD at [address] [place] W/ SUBCUTANEOUS PORT Right 02/07/2019       Family History   Problem Relation Age of Onset    High Blood Pressure Mother     Diabetes Mother     High Blood Pressure Father     Anemia Sister         Sickle Cell    Cancer Maternal Aunt         Throat    Colon Cancer Maternal Grandmother        Allergies as of 03/13/2023    (No Known Allergies)       Social History     Tobacco Use    Smoking status: Never    Smokeless tobacco: Never   Vaping Use    Vaping Use: Never used   Substance Use Topics    Alcohol use: No    Drug use: No       Current Outpatient Medications on File Prior to Visit   Medication Sig Dispense Refill    Leuprolide Acetate (LUPRON IJ) Infuse 50 mg intravenously      letrozole (FEMARA) 2.5 MG tablet Take 2.5 mg by mouth daily       No current facility-administered medications on file prior to visit. Medications: documentation has been reviewed in the electronic medical record and patient office intake form. REVIEW OF SYSTEMS:  Constitutional: Negative for unexpected weight change. Eyes: Negative for visual disturbance. Respiratory: Negative for cough and shortness of breath. Cardiovascular: Negative for chest pain. Gastrointestinal: Negative for abdominal pain. Musculoskeletal: Negative for arthralgias and myalgias. Neurological: Negative for headaches. Hematological: Negative for adenopathy. Psychiatric/Behavioral: Negative for dysphoric mood.  The patient is not nervous/anxious. PHYSICAL EXAM:  Pulse 74   Temp 97 °F (36.1 °C)   Resp 18   Ht 5' 5\" (1.651 m)   Wt 241 lb 6.4 oz (109.5 kg)   LMP 04/18/2019   SpO2 98%   BMI 40.17 kg/m²   Constitutional: She appearswell-nourished. No apparent distress. Breast: The patient was examined in the upright and supine position. Breasts are symmetrically ptotic. Right: No new masses or changes in breast contour. No skin changes of the breast or nipple areolar complex. No nipple inversion or discharge. No erythema, thickening (peau d'orange), or dimpling. Left: No new masses or changes in breast contour. No skin changes of the breast or nipple areolar complex. No nipple inversion or discharge. No erythema, thickening (peau d'orange), or dimpling. There is no axillary lymphadenopathy palpated bilaterally. Head: Normocephalic and atraumatic:   Eyes: EOM are normal. Pupils are equal, round, and reactive to light. Neck: Neck supple. No tracheal deviation present. No obvious mass. Lymphatics: No palpable supraclavicular, cervical, or axillary lymphadenopathy  Skin: No rash noted. No erythema. Neurologic: alert and oriented. Extremities: appear well perfused. ASSESSMENT:  - Screening Breast Examination - stable breast examination and stable screening mammogram.   No concerning findings suggestive of malignancy or recurrence at this time. - Personal History of Breast Cancer -  s/p left partial mastectomy/snbx/Biozorb insertion 8/16/2019 post neoadjuvant chemotherapy (6 cycles of TCHP and maintenance Pertuzumab/trastuzumab). 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 Stage IIB  T2N1 ->T1bN0. ER/NH positive, Her2 positive. Postop radiation (completed 11/2019).   Completed adjuvant therapy with Kadcyla 6/2020.   - Aromatase inhibitor use - Receiving Lupron injections/letrozole (started 9/27/2019) per medical oncology   - Encounter for follow-up surveillance of breast cancer      PLAN:   Continue annual screening mammography with tomosynthesis. Due 2/2023  Continue annual clinical breast exam  Continue endocrine therapy per medical oncology   Agree with Dr. Meme Ward recommendation for bilateral salpingo-oophorectomies. Signs/symptoms of recurrence were reviewed. She verbalizes understanding that she should notify the office if she identifies any abnormalities on self evaluation. Follow up cancer surveillance discussed   Discussed the importance of breast awareness including the importance and technique of self breast exams  Today's imaging was reviewed, documented above, the results were discussed with the patient, all questions answered  Healthy Lifestyle Recommendations: healthy diet (decrease consumption of red meat, increase fresh fruits and vegetables), decreased alcohol consumption (less than 4 drinks/week), adequate sleep (goal 6-8 hours), routine exercise (goal 150 minutes/week or greater), weight control. Follow up after annual mammogram in 1 year          SOFIE Lion  [facility])   Surgical Breast Oncology   [phone]    All of the patient's questions were answered at this time however, she was encouraged to call the office with any further inquiries. Approximately 20 minutes of time were spent in preparation, direct patient contact, counseling, care coordination, documentation and activities otherwise related to this encounter.

## 2024-03-18 ENCOUNTER — OFFICE VISIT (OUTPATIENT)
Dept: SURGERY | Age: 44
End: 2024-03-18
Payer: COMMERCIAL

## 2024-03-18 ENCOUNTER — HOSPITAL ENCOUNTER (OUTPATIENT)
Dept: MAMMOGRAPHY | Age: 44
Discharge: HOME | End: 2024-03-18
Payer: COMMERCIAL

## 2024-03-18 VITALS
HEART RATE: 82 BPM | BODY MASS INDEX: 39.29 KG/M2 | HEIGHT: 65 IN | RESPIRATION RATE: 18 BRPM | WEIGHT: 235.8 LBS | OXYGEN SATURATION: 100 %

## 2024-03-18 VITALS — WEIGHT: 241 LBS | BODY MASS INDEX: 40.15 KG/M2 | HEIGHT: 65 IN

## 2024-03-18 DIAGNOSIS — Z12.39 ENCOUNTER FOR SCREENING BREAST EXAMINATION: Primary | ICD-10-CM

## 2024-03-18 DIAGNOSIS — Z85.3 HISTORY OF BREAST CANCER: ICD-10-CM

## 2024-03-18 DIAGNOSIS — Z08 ENCOUNTER FOR FOLLOW-UP SURVEILLANCE OF BREAST CANCER: ICD-10-CM

## 2024-03-18 DIAGNOSIS — Z85.3 ENCOUNTER FOR FOLLOW-UP SURVEILLANCE OF BREAST CANCER: ICD-10-CM

## 2024-03-18 DIAGNOSIS — Z12.31 ENCOUNTER FOR SCREENING MAMMOGRAM FOR BREAST CANCER: ICD-10-CM

## 2024-03-18 DIAGNOSIS — Z85.3 ENCOUNTER FOR FOLLOW-UP SURVEILLANCE OF BREAST CANCER: Primary | ICD-10-CM

## 2024-03-18 DIAGNOSIS — Z08 ENCOUNTER FOR FOLLOW-UP SURVEILLANCE OF BREAST CANCER: Primary | ICD-10-CM

## 2024-03-18 DIAGNOSIS — Z90.12 HISTORY OF PARTIAL MASTECTOMY OF LEFT BREAST: ICD-10-CM

## 2024-03-18 PROCEDURE — 99213 OFFICE O/P EST LOW 20 MIN: CPT | Performed by: NURSE PRACTITIONER

## 2024-03-18 PROCEDURE — G8419 CALC BMI OUT NRM PARAM NOF/U: HCPCS | Performed by: NURSE PRACTITIONER

## 2024-03-18 PROCEDURE — G8484 FLU IMMUNIZE NO ADMIN: HCPCS | Performed by: NURSE PRACTITIONER

## 2024-03-18 PROCEDURE — G8427 DOCREV CUR MEDS BY ELIG CLIN: HCPCS | Performed by: NURSE PRACTITIONER

## 2024-03-18 PROCEDURE — 77063 BREAST TOMOSYNTHESIS BI: CPT

## 2024-03-18 PROCEDURE — 1036F TOBACCO NON-USER: CPT | Performed by: NURSE PRACTITIONER

## 2024-03-18 NOTE — PROGRESS NOTES
[facility]   Surgical Breast Oncology      Medical Oncologist: Beau   Radiation Oncologist: Damon       CC: One year [id number] for breast check and mammogram     HPI: Doron Ramires is a 43 y.o. woman here for annual follow up for breast check secondary to personal history of left breast cancer.  She is s/p left partial mastectomy/snbx/Biozorb insertion 8/16/2019 post neoadjuvant chemotherapy (6 cycles of TCHP and maintenance Pertuzumab/trastuzumab). 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 Stage IIB  T2N1 ->T1bN0. ER/MS positive, Her2 positive. Postop radiation (completed 11/2019).  Completed adjuvant therapy with Kadcyla 6/2020. Receiving Lupron injections/letrozole (started 9/27/2019).  Negative genetic testing.   Developed port infection and bacterial endocarditis.      She has no breast related concerns today.  She states that she does perform routine self breast evaluations and has not noticed any new abnormalities such as masses, skin changes, color changes,nipple discharge, or changes to the nipple-areolar complex.     Mammogram today reveals no new suspicious or concerning findings suggestive of malignancy.  BI-RADS 2.        Dr. Yanez recommended bilateral salpingo-oophorectomies, she saw Dr. Greenwood 2022 and is considering but not ready to move forward at this time.      Past Medical History:   Diagnosis Date    Breast cancer ([facility]) 01/2019    Left breast    Breast mass     History of bacterial endocarditis     Related to port infection, resolved    History of therapeutic radiation 2019    Hx antineoplastic chemo 2019    Irregular uterine bleeding     Sickle cell trait ([facility])        Past Surgical History:   Procedure Laterality Date    BREAST BIOPSY      BREAST LUMPECTOMY Left 08/16/2019    MAMMOGRAM GUIDED NEEDLE LOCALIZED LEFT BREAST PARTIAL MASTECTOMY performed by Linh Bourgeois MD at [facility] OR    CATHETER REMOVAL Right 04/01/2019    PORT REMOVAL performed by Zaira ANTONIO

## 2025-04-08 LAB
Lab: NORMAL
REPORT: NORMAL
THIS TEST SENT TO: NORMAL

## (undated) DEVICE — ELECTRODE PT RET AD L9FT HI MOIST COND ADH HYDRGEL CORDED

## (undated) DEVICE — MINOR SET UP PK

## (undated) DEVICE — SYRINGE MED 20ML STD CLR PLAS LUERLOCK TIP N CTRL DISP

## (undated) DEVICE — GLOVE ORANGE PI 8 1/2   MSG9085

## (undated) DEVICE — SUTURE VCRL SZ 3-0 L27IN ABSRB UD L26MM SH 1/2 CIR J416H

## (undated) DEVICE — MEDICINE CUPS: Brand: DEROYAL

## (undated) DEVICE — 3M™ STERI-STRIP™ COMPOUND BENZOIN TINCTURE 40 BAGS/CARTON 4 CARTONS/CASE C1544: Brand: 3M™ STERI-STRIP™

## (undated) DEVICE — CURITY PLAIN PACKING STRIP: Brand: CURITY

## (undated) DEVICE — 3M™ STERI-DRAPE™ INCISE DRAPE 1050 (60CM X 45CM): Brand: STERI-DRAPE™

## (undated) DEVICE — STANDARD HYPODERMIC NEEDLE,POLYPROPYLENE HUB: Brand: MONOJECT

## (undated) DEVICE — SUTURE PERMA-HAND SZ 2-0 L30IN NONABSORBABLE BLK L26MM SH K833H

## (undated) DEVICE — BRA SURG SUPP SUPER QUEEN 50-54 IN VELCRO STRP

## (undated) DEVICE — CHLORAPREP 26ML ORANGE

## (undated) DEVICE — Z CONVERTED USE 2271043 CONTAINER SPEC COLL 4OZ SCR ON LID PEEL PCH

## (undated) DEVICE — KIT OR ROOM TURNOVER W/STRAP

## (undated) DEVICE — PENCIL ES ULT VAC W TELSCP NOSE EZ CLN BLDE 10FT

## (undated) DEVICE — SUTURE MCRYL SZ 4-0 L27IN ABSRB UD L19MM PS-2 1/2 CIR PRIM Y426H

## (undated) DEVICE — SURGICAL SET UP - SURE SET: Brand: MEDLINE INDUSTRIES, INC.

## (undated) DEVICE — GOWN,SIRUS,POLYRNF,BRTHSLV,LG,30/CS: Brand: MEDLINE

## (undated) DEVICE — SHEARS ENDOSCP L9CM CRV HARM FOCS +

## (undated) DEVICE — SUTURE VCRL SZ 4-0 L27IN ABSRB UD L19MM FS-2 3/8 CIR REV J422H

## (undated) DEVICE — PRE OP PACK: Brand: MEDLINE INDUSTRIES, INC.

## (undated) DEVICE — COVER LT HNDL BLU PLAS

## (undated) DEVICE — BOWL MED L 32OZ PLAS W/ MOLD GRAD EZ OPN PEEL PCH

## (undated) DEVICE — SURE SET-DOUBLE BASIN-LF: Brand: MEDLINE INDUSTRIES, INC.

## (undated) DEVICE — DRAPE C ARM UNIV W41XL74IN CLR PLAS XR VELC CLSR POLY STRP

## (undated) DEVICE — E-Z CLEAN, NON-STICK, PTFE COATED, ELECTROSURGICAL BLADE ELECTRODE, MODIFIED EXTENDED INSULATION, 2.5 INCH (6.35 CM): Brand: MEGADYNE

## (undated) DEVICE — SPONGE,LAP,18"X18",DLX,XR,ST,5/PK,40/PK: Brand: MEDLINE

## (undated) DEVICE — INTENDED FOR TISSUE SEPARATION, AND OTHER PROCEDURES THAT REQUIRE A SHARP SURGICAL BLADE TO PUNCTURE OR CUT.: Brand: BARD-PARKER ® CARBON RIB-BACK BLADES

## (undated) DEVICE — SOLUTION IV 1000ML 0.9% SOD CHL

## (undated) DEVICE — INTENDED FOR TISSUE SEPARATION, AND OTHER PROCEDURES THAT REQUIRE A SHARP SURGICAL BLADE TO PUNCTURE OR CUT.: Brand: BARD-PARKER ® STAINLESS STEEL BLADES

## (undated) DEVICE — YANKAUER,OPEN TIP,W/O VENT,STERILE: Brand: MEDLINE INDUSTRIES, INC.

## (undated) DEVICE — SYRINGE, LUER LOCK, 10ML: Brand: MEDLINE

## (undated) DEVICE — DRAPE,T,LAPARO,TRANS,STERILE: Brand: MEDLINE

## (undated) DEVICE — PLATE ES AD W 9FT CRD 2

## (undated) DEVICE — GLOVE SURG SZ 65 L12IN FNGR THK87MIL DK GRN LTX POLYMER W

## (undated) DEVICE — ELECTROSURGICAL PENCIL BUTTON SWITCH NON COATED BLADE ELECTRODE 10 FT (3 M) CORD HOLSTER: Brand: MEGADYNE

## (undated) DEVICE — SUTURE VCRL SZ 3-0 L18IN ABSRB UD L26MM SH 1/2 CIR J864D

## (undated) DEVICE — 3M™ TEGADERM™ TRANSPARENT FILM DRESSING FRAME STYLE, 1626W, 4 IN X 4-3/4 IN (10 CM X 12 CM), 50/CT 4CT/CASE: Brand: 3M™ TEGADERM™

## (undated) DEVICE — SYRINGE TB 1ML NDL 27GA L0.5IN PLAS W/ SFTY LOK PERM NDL

## (undated) DEVICE — STRIP,CLOSURE,WOUND,MEDI-STRIP,1/2X4: Brand: MEDLINE

## (undated) DEVICE — SKIN AFFIX SURG ADHESIVE 72/CS 0.55ML: Brand: MEDLINE

## (undated) DEVICE — 3M™ WARMING BLANKET, LOWER BODY, 10 PER CASE, 42568: Brand: BAIR HUGGER™

## (undated) DEVICE — GAUZE,SPONGE,4"X4",16PLY,XRAY,STRL,LF: Brand: MEDLINE

## (undated) DEVICE — SOLUTION IV 1000ML 0.9% SOD CHL PH 5 INJ USP VIAFLX PLAS

## (undated) DEVICE — GLOVE ORANGE PI 7   MSG9070

## (undated) DEVICE — SYRINGE MED 10ML LUERLOCK TIP W/O SFTY DISP

## (undated) DEVICE — 6 ML SYRINGE LUER-LOCK TIP: Brand: MONOJECT

## (undated) DEVICE — BLADE ES ELASTOMERIC COAT INSUL DURABLE BEND UPTO 90DEG

## (undated) DEVICE — GARMENT,MEDLINE,DVT,INT,CALF,MED, GEN2: Brand: MEDLINE

## (undated) DEVICE — GOWN,SIRUS,POLYRNF,BRTHSLV,XLN/XL,20/CS: Brand: MEDLINE

## (undated) DEVICE — DECANTER BAG 9": Brand: MEDLINE INDUSTRIES, INC.

## (undated) DEVICE — GAUZE,SPONGE,2"X2",8PLY,STERILE,LF,2'S: Brand: MEDLINE

## (undated) DEVICE — ELECTROSURGICAL PENCIL ROCKER SWITCH NON COATED BLADE ELECTRODE 10 FT (3 M) CORD HOLSTER: Brand: MEGADYNE

## (undated) DEVICE — COVER US PRB W13XL244CM SURGICAL INTRAOPERATIVE W RUBBERBAND

## (undated) DEVICE — TURNOVER KIT RM INF CTRL TECH

## (undated) DEVICE — SYRINGE MED 5ML STD CLR PLAS N CTRL SLIP TIP DISP